# Patient Record
Sex: FEMALE | Race: WHITE | NOT HISPANIC OR LATINO
[De-identification: names, ages, dates, MRNs, and addresses within clinical notes are randomized per-mention and may not be internally consistent; named-entity substitution may affect disease eponyms.]

---

## 2018-08-30 ENCOUNTER — RESULT REVIEW (OUTPATIENT)
Age: 53
End: 2018-08-30

## 2019-11-02 ENCOUNTER — RESULT REVIEW (OUTPATIENT)
Age: 54
End: 2019-11-02

## 2022-11-02 ENCOUNTER — NON-APPOINTMENT (OUTPATIENT)
Age: 57
End: 2022-11-02

## 2025-02-04 ENCOUNTER — APPOINTMENT (OUTPATIENT)
Dept: BREAST CENTER | Facility: CLINIC | Age: 60
End: 2025-02-04
Payer: COMMERCIAL

## 2025-02-04 VITALS — WEIGHT: 155 LBS | HEIGHT: 68 IN | BODY MASS INDEX: 23.49 KG/M2

## 2025-02-04 DIAGNOSIS — N64.89 OTHER SPECIFIED DISORDERS OF BREAST: ICD-10-CM

## 2025-02-04 PROCEDURE — 99203 OFFICE O/P NEW LOW 30 MIN: CPT | Mod: 25

## 2025-02-04 PROCEDURE — 76642 ULTRASOUND BREAST LIMITED: CPT | Mod: LT

## 2025-02-21 ENCOUNTER — APPOINTMENT (OUTPATIENT)
Dept: PLASTIC SURGERY | Facility: CLINIC | Age: 60
End: 2025-02-21
Payer: COMMERCIAL

## 2025-02-21 VITALS
WEIGHT: 155 LBS | DIASTOLIC BLOOD PRESSURE: 77 MMHG | HEART RATE: 72 BPM | TEMPERATURE: 97.5 F | HEIGHT: 68 IN | SYSTOLIC BLOOD PRESSURE: 145 MMHG | OXYGEN SATURATION: 98 % | BODY MASS INDEX: 23.49 KG/M2

## 2025-02-21 DIAGNOSIS — N64.89 OTHER SPECIFIED DISORDERS OF BREAST: ICD-10-CM

## 2025-02-21 DIAGNOSIS — Z42.1 ENCOUNTER FOR BREAST RECONSTRUCTION FOLLOWING MASTECTOMY: ICD-10-CM

## 2025-02-21 DIAGNOSIS — Z85.3 PERSONAL HISTORY OF MALIGNANT NEOPLASM OF BREAST: ICD-10-CM

## 2025-02-21 PROCEDURE — 99205 OFFICE O/P NEW HI 60 MIN: CPT

## 2025-02-24 ENCOUNTER — APPOINTMENT (OUTPATIENT)
Dept: BREAST CENTER | Facility: CLINIC | Age: 60
End: 2025-02-24

## 2025-02-24 ENCOUNTER — TRANSCRIPTION ENCOUNTER (OUTPATIENT)
Age: 60
End: 2025-02-24

## 2025-02-24 VITALS
WEIGHT: 155 LBS | HEIGHT: 68 IN | TEMPERATURE: 97.8 F | HEART RATE: 74 BPM | BODY MASS INDEX: 23.49 KG/M2 | DIASTOLIC BLOOD PRESSURE: 69 MMHG | SYSTOLIC BLOOD PRESSURE: 109 MMHG | OXYGEN SATURATION: 99 %

## 2025-02-24 DIAGNOSIS — N64.89 OTHER SPECIFIED DISORDERS OF BREAST: ICD-10-CM

## 2025-02-24 DIAGNOSIS — Z85.3 PERSONAL HISTORY OF MALIGNANT NEOPLASM OF BREAST: ICD-10-CM

## 2025-02-24 PROCEDURE — 99205 OFFICE O/P NEW HI 60 MIN: CPT

## 2025-02-27 ENCOUNTER — APPOINTMENT (OUTPATIENT)
Dept: MRI IMAGING | Facility: CLINIC | Age: 60
End: 2025-02-27
Payer: COMMERCIAL

## 2025-02-27 ENCOUNTER — RESULT REVIEW (OUTPATIENT)
Age: 60
End: 2025-02-27

## 2025-02-27 PROCEDURE — 74018 RADEX ABDOMEN 1 VIEW: CPT

## 2025-02-27 PROCEDURE — 74185 MRA ABD W OR W/O CNTRST: CPT

## 2025-02-27 PROCEDURE — 72198 MR ANGIO PELVIS W/O & W/DYE: CPT

## 2025-02-27 PROCEDURE — A9585: CPT | Mod: NC,JW

## 2025-03-04 ENCOUNTER — APPOINTMENT (OUTPATIENT)
Dept: PLASTIC SURGERY | Facility: CLINIC | Age: 60
End: 2025-03-04

## 2025-03-06 ENCOUNTER — APPOINTMENT (OUTPATIENT)
Dept: MRI IMAGING | Facility: CLINIC | Age: 60
End: 2025-03-06

## 2025-03-06 ENCOUNTER — APPOINTMENT (OUTPATIENT)
Dept: RADIOLOGY | Facility: CLINIC | Age: 60
End: 2025-03-06

## 2025-03-12 ENCOUNTER — OUTPATIENT (OUTPATIENT)
Dept: OUTPATIENT SERVICES | Facility: HOSPITAL | Age: 60
LOS: 1 days | End: 2025-03-12
Payer: COMMERCIAL

## 2025-03-12 VITALS
WEIGHT: 153.88 LBS | DIASTOLIC BLOOD PRESSURE: 77 MMHG | RESPIRATION RATE: 18 BRPM | HEIGHT: 68 IN | HEART RATE: 81 BPM | SYSTOLIC BLOOD PRESSURE: 118 MMHG | TEMPERATURE: 98 F | OXYGEN SATURATION: 100 %

## 2025-03-12 DIAGNOSIS — Z90.13 ACQUIRED ABSENCE OF BILATERAL BREASTS AND NIPPLES: ICD-10-CM

## 2025-03-12 DIAGNOSIS — Z98.890 OTHER SPECIFIED POSTPROCEDURAL STATES: Chronic | ICD-10-CM

## 2025-03-12 DIAGNOSIS — Z85.3 PERSONAL HISTORY OF MALIGNANT NEOPLASM OF BREAST: ICD-10-CM

## 2025-03-12 DIAGNOSIS — Z01.818 ENCOUNTER FOR OTHER PREPROCEDURAL EXAMINATION: ICD-10-CM

## 2025-03-12 DIAGNOSIS — Z98.49 CATARACT EXTRACTION STATUS, UNSPECIFIED EYE: Chronic | ICD-10-CM

## 2025-03-12 DIAGNOSIS — N65.0 DEFORMITY OF RECONSTRUCTED BREAST: ICD-10-CM

## 2025-03-12 DIAGNOSIS — Z98.891 HISTORY OF UTERINE SCAR FROM PREVIOUS SURGERY: Chronic | ICD-10-CM

## 2025-03-12 DIAGNOSIS — N65.1 DISPROPORTION OF RECONSTRUCTED BREAST: ICD-10-CM

## 2025-03-12 LAB
ANION GAP SERPL CALC-SCNC: 6 MMOL/L — SIGNIFICANT CHANGE UP (ref 5–17)
BASOPHILS # BLD AUTO: 0.05 K/UL — SIGNIFICANT CHANGE UP (ref 0–0.2)
BASOPHILS NFR BLD AUTO: 0.9 % — SIGNIFICANT CHANGE UP (ref 0–2)
BLD GP AB SCN SERPL QL: SIGNIFICANT CHANGE UP
BUN SERPL-MCNC: 17 MG/DL — SIGNIFICANT CHANGE UP (ref 7–23)
CALCIUM SERPL-MCNC: 9.2 MG/DL — SIGNIFICANT CHANGE UP (ref 8.4–10.5)
CHLORIDE SERPL-SCNC: 105 MMOL/L — SIGNIFICANT CHANGE UP (ref 96–108)
CO2 SERPL-SCNC: 30 MMOL/L — SIGNIFICANT CHANGE UP (ref 22–31)
CREAT SERPL-MCNC: 0.71 MG/DL — SIGNIFICANT CHANGE UP (ref 0.5–1.3)
EGFR: 97 ML/MIN/1.73M2 — SIGNIFICANT CHANGE UP
EGFR: 97 ML/MIN/1.73M2 — SIGNIFICANT CHANGE UP
EOSINOPHIL # BLD AUTO: 0.18 K/UL — SIGNIFICANT CHANGE UP (ref 0–0.5)
EOSINOPHIL NFR BLD AUTO: 3.1 % — SIGNIFICANT CHANGE UP (ref 0–6)
GLUCOSE SERPL-MCNC: 83 MG/DL — SIGNIFICANT CHANGE UP (ref 70–99)
HCT VFR BLD CALC: 38.4 % — SIGNIFICANT CHANGE UP (ref 34.5–45)
HGB BLD-MCNC: 12.9 G/DL — SIGNIFICANT CHANGE UP (ref 11.5–15.5)
IMM GRANULOCYTES NFR BLD AUTO: 0.2 % — SIGNIFICANT CHANGE UP (ref 0–0.9)
LYMPHOCYTES # BLD AUTO: 1.29 K/UL — SIGNIFICANT CHANGE UP (ref 1–3.3)
LYMPHOCYTES # BLD AUTO: 21.9 % — SIGNIFICANT CHANGE UP (ref 13–44)
MCHC RBC-ENTMCNC: 30.8 PG — SIGNIFICANT CHANGE UP (ref 27–34)
MCHC RBC-ENTMCNC: 33.6 G/DL — SIGNIFICANT CHANGE UP (ref 32–36)
MCV RBC AUTO: 91.6 FL — SIGNIFICANT CHANGE UP (ref 80–100)
MONOCYTES # BLD AUTO: 0.4 K/UL — SIGNIFICANT CHANGE UP (ref 0–0.9)
MONOCYTES NFR BLD AUTO: 6.8 % — SIGNIFICANT CHANGE UP (ref 2–14)
NEUTROPHILS # BLD AUTO: 3.95 K/UL — SIGNIFICANT CHANGE UP (ref 1.8–7.4)
NEUTROPHILS NFR BLD AUTO: 67.1 % — SIGNIFICANT CHANGE UP (ref 43–77)
NRBC BLD AUTO-RTO: 0 /100 WBCS — SIGNIFICANT CHANGE UP (ref 0–0)
PLATELET # BLD AUTO: 207 K/UL — SIGNIFICANT CHANGE UP (ref 150–400)
POTASSIUM SERPL-MCNC: 4.1 MMOL/L — SIGNIFICANT CHANGE UP (ref 3.5–5.3)
POTASSIUM SERPL-SCNC: 4.1 MMOL/L — SIGNIFICANT CHANGE UP (ref 3.5–5.3)
RBC # BLD: 4.19 M/UL — SIGNIFICANT CHANGE UP (ref 3.8–5.2)
RBC # FLD: 12.7 % — SIGNIFICANT CHANGE UP (ref 10.3–14.5)
SODIUM SERPL-SCNC: 141 MMOL/L — SIGNIFICANT CHANGE UP (ref 135–145)
WBC # BLD: 5.88 K/UL — SIGNIFICANT CHANGE UP (ref 3.8–10.5)
WBC # FLD AUTO: 5.88 K/UL — SIGNIFICANT CHANGE UP (ref 3.8–10.5)

## 2025-03-12 PROCEDURE — 85025 COMPLETE CBC W/AUTO DIFF WBC: CPT

## 2025-03-12 PROCEDURE — 86850 RBC ANTIBODY SCREEN: CPT

## 2025-03-12 PROCEDURE — 36415 COLL VENOUS BLD VENIPUNCTURE: CPT

## 2025-03-12 PROCEDURE — G0463: CPT

## 2025-03-12 PROCEDURE — 80048 BASIC METABOLIC PNL TOTAL CA: CPT

## 2025-03-12 PROCEDURE — 86900 BLOOD TYPING SEROLOGIC ABO: CPT

## 2025-03-12 PROCEDURE — 86901 BLOOD TYPING SEROLOGIC RH(D): CPT

## 2025-03-12 NOTE — H&P PST ADULT - HISTORY OF PRESENT ILLNESS
Patient is a 59 year old F with PMHx of L breast CA s/p RTX presents for preoperative testing for bilateral skin sparing mastectomy with GHULAM flap reconstruction with Dr. Naz Rivera scheduled for 04/02/2025. Reports having a breast cancer scare with scheduled mammogram in February, therefore opting for upcoming surgical intervention. Denies palpable breast masses, nipple discharge, rashes, nipple inversion, breast pain or any acute symptoms at this time. Patient reports feeling well overall.

## 2025-03-12 NOTE — H&P PST ADULT - PROBLEM SELECTOR PLAN 1
Patient provided with pre-operative instructions and verbalized understanding.  Patient will be NPO on day of surgery. Patient will stop NSAIDs, aspirin, herbal supplements or vitamins 1 week prior to surgery.  Chlorhexidine wash provided with instructions, verbalized understanding.     Pending cardiac clearance as per surgeon.

## 2025-03-12 NOTE — H&P PST ADULT - PROBLEM/PLAN-1
I will give a Medrol Dosepak.  If she develops worsening shortness of breath or fever, she should go to the emergency department.   DISPLAY PLAN FREE TEXT

## 2025-03-12 NOTE — H&P PST ADULT - NSICDXPASTSURGICALHX_GEN_ALL_CORE_FT
PAST SURGICAL HISTORY:  History of cataract surgery     History of  section     S/P lumpectomy, left breast     Status post meniscectomy

## 2025-03-12 NOTE — H&P PST ADULT - NSANTHOSAYNRD_GEN_A_CORE
neck 13 inches/No. BRI screening performed.  STOP BANG Legend: 0-2 = LOW Risk; 3-4 = INTERMEDIATE Risk; 5-8 = HIGH Risk

## 2025-03-12 NOTE — H&P PST ADULT - NSICDXFAMILYHX_GEN_ALL_CORE_FT
FAMILY HISTORY:  FH: HTN (hypertension)  FHx: heart disease    Mother  Still living? Unknown  Family hx of lung cancer, Age at diagnosis: Age Unknown

## 2025-03-21 ENCOUNTER — APPOINTMENT (OUTPATIENT)
Dept: PLASTIC SURGERY | Facility: CLINIC | Age: 60
End: 2025-03-21

## 2025-03-21 VITALS
HEIGHT: 68 IN | BODY MASS INDEX: 22.73 KG/M2 | OXYGEN SATURATION: 100 % | WEIGHT: 150 LBS | DIASTOLIC BLOOD PRESSURE: 72 MMHG | TEMPERATURE: 96.5 F | SYSTOLIC BLOOD PRESSURE: 103 MMHG | HEART RATE: 80 BPM

## 2025-03-21 DIAGNOSIS — N65.0 DEFORMITY OF RECONSTRUCTED BREAST: ICD-10-CM

## 2025-03-21 DIAGNOSIS — N64.89 OTHER SPECIFIED DISORDERS OF BREAST: ICD-10-CM

## 2025-03-21 PROBLEM — C50.912 MALIGNANT NEOPLASM OF UNSPECIFIED SITE OF LEFT FEMALE BREAST: Chronic | Status: ACTIVE | Noted: 2025-03-12

## 2025-03-21 PROBLEM — Z90.13 ACQUIRED ABSENCE OF BILATERAL BREASTS AND NIPPLES: Chronic | Status: ACTIVE | Noted: 2025-03-12

## 2025-03-21 PROCEDURE — 99215 OFFICE O/P EST HI 40 MIN: CPT

## 2025-03-21 RX ORDER — IBUPROFEN 800 MG/1
800 TABLET, FILM COATED ORAL EVERY 6 HOURS
Qty: 20 | Refills: 0 | Status: ACTIVE | COMMUNITY
Start: 2025-03-21 | End: 1900-01-01

## 2025-03-21 RX ORDER — CYCLOBENZAPRINE HYDROCHLORIDE 10 MG/1
10 TABLET, FILM COATED ORAL
Qty: 15 | Refills: 0 | Status: ACTIVE | COMMUNITY
Start: 2025-03-21 | End: 1900-01-01

## 2025-03-21 RX ORDER — OXYCODONE AND ACETAMINOPHEN 5; 325 MG/1; MG/1
5-325 TABLET ORAL
Qty: 20 | Refills: 0 | Status: ACTIVE | COMMUNITY
Start: 2025-03-21 | End: 1900-01-01

## 2025-03-21 RX ORDER — SULFAMETHOXAZOLE AND TRIMETHOPRIM 800; 160 MG/1; MG/1
800-160 TABLET ORAL
Qty: 14 | Refills: 0 | Status: ACTIVE | COMMUNITY
Start: 2025-03-21 | End: 1900-01-01

## 2025-03-31 ENCOUNTER — APPOINTMENT (OUTPATIENT)
Dept: BREAST CENTER | Facility: CLINIC | Age: 60
End: 2025-03-31
Payer: COMMERCIAL

## 2025-03-31 VITALS
SYSTOLIC BLOOD PRESSURE: 109 MMHG | TEMPERATURE: 97.1 F | DIASTOLIC BLOOD PRESSURE: 74 MMHG | BODY MASS INDEX: 22.73 KG/M2 | HEIGHT: 68 IN | OXYGEN SATURATION: 96 % | HEART RATE: 76 BPM | WEIGHT: 150 LBS

## 2025-03-31 DIAGNOSIS — Z85.3 PERSONAL HISTORY OF MALIGNANT NEOPLASM OF BREAST: ICD-10-CM

## 2025-03-31 DIAGNOSIS — F41.9 ANXIETY DISORDER, UNSPECIFIED: ICD-10-CM

## 2025-03-31 DIAGNOSIS — N64.89 OTHER SPECIFIED DISORDERS OF BREAST: ICD-10-CM

## 2025-03-31 PROCEDURE — 99214 OFFICE O/P EST MOD 30 MIN: CPT

## 2025-03-31 RX ORDER — ALPRAZOLAM 0.5 MG/1
0.5 TABLET ORAL 3 TIMES DAILY
Qty: 6 | Refills: 0 | Status: ACTIVE | COMMUNITY
Start: 2025-03-31 | End: 1900-01-01

## 2025-04-01 NOTE — PATIENT PROFILE ADULT - REASON FOR REFUSAL (REFER PATIENT TO HEALTHCARE PROVIDER FOR FOLLOW-UP):
You may receive a survey in the mail regarding your office visit.  It would be greatly appreciated if you would take a few minutes to fill out the survey.  This is of great value to us.  Your response will tell us how well we met your needs.  Your honest evaluation can make us aware of our strengths as well as areas where you believe we might improve.      
never had

## 2025-04-02 ENCOUNTER — APPOINTMENT (OUTPATIENT)
Dept: PLASTIC SURGERY | Facility: HOSPITAL | Age: 60
End: 2025-04-02
Payer: COMMERCIAL

## 2025-04-02 ENCOUNTER — APPOINTMENT (OUTPATIENT)
Dept: BREAST CENTER | Facility: HOSPITAL | Age: 60
End: 2025-04-02

## 2025-04-02 ENCOUNTER — INPATIENT (INPATIENT)
Facility: HOSPITAL | Age: 60
LOS: 1 days | Discharge: HOME | DRG: 951 | End: 2025-04-04
Attending: SURGERY | Admitting: SURGERY
Payer: COMMERCIAL

## 2025-04-02 ENCOUNTER — RESULT REVIEW (OUTPATIENT)
Age: 60
End: 2025-04-02

## 2025-04-02 VITALS
OXYGEN SATURATION: 97 % | WEIGHT: 150.36 LBS | SYSTOLIC BLOOD PRESSURE: 97 MMHG | TEMPERATURE: 98 F | RESPIRATION RATE: 16 BRPM | DIASTOLIC BLOOD PRESSURE: 64 MMHG | HEART RATE: 78 BPM | HEIGHT: 68 IN

## 2025-04-02 DIAGNOSIS — Z98.890 OTHER SPECIFIED POSTPROCEDURAL STATES: Chronic | ICD-10-CM

## 2025-04-02 DIAGNOSIS — Z98.891 HISTORY OF UTERINE SCAR FROM PREVIOUS SURGERY: Chronic | ICD-10-CM

## 2025-04-02 DIAGNOSIS — Z98.49 CATARACT EXTRACTION STATUS, UNSPECIFIED EYE: Chronic | ICD-10-CM

## 2025-04-02 DIAGNOSIS — Z90.13 ACQUIRED ABSENCE OF BILATERAL BREASTS AND NIPPLES: ICD-10-CM

## 2025-04-02 LAB
BASOPHILS # BLD AUTO: 0.03 K/UL — SIGNIFICANT CHANGE UP (ref 0–0.2)
BASOPHILS NFR BLD AUTO: 0.2 % — SIGNIFICANT CHANGE UP (ref 0–2)
BLD GP AB SCN SERPL QL: SIGNIFICANT CHANGE UP
EOSINOPHIL # BLD AUTO: 0 K/UL — SIGNIFICANT CHANGE UP (ref 0–0.5)
EOSINOPHIL NFR BLD AUTO: 0 % — SIGNIFICANT CHANGE UP (ref 0–6)
HCT VFR BLD CALC: 31.6 % — LOW (ref 34.5–45)
HGB BLD-MCNC: 10.4 G/DL — LOW (ref 11.5–15.5)
IMM GRANULOCYTES NFR BLD AUTO: 0.3 % — SIGNIFICANT CHANGE UP (ref 0–0.9)
LYMPHOCYTES # BLD AUTO: 0.66 K/UL — LOW (ref 1–3.3)
LYMPHOCYTES # BLD AUTO: 4.3 % — LOW (ref 13–44)
MCHC RBC-ENTMCNC: 30.9 PG — SIGNIFICANT CHANGE UP (ref 27–34)
MCHC RBC-ENTMCNC: 32.9 G/DL — SIGNIFICANT CHANGE UP (ref 32–36)
MCV RBC AUTO: 93.8 FL — SIGNIFICANT CHANGE UP (ref 80–100)
MONOCYTES # BLD AUTO: 0.98 K/UL — HIGH (ref 0–0.9)
MONOCYTES NFR BLD AUTO: 6.3 % — SIGNIFICANT CHANGE UP (ref 2–14)
NEUTROPHILS # BLD AUTO: 13.76 K/UL — HIGH (ref 1.8–7.4)
NEUTROPHILS NFR BLD AUTO: 88.9 % — HIGH (ref 43–77)
NRBC BLD AUTO-RTO: 0 /100 WBCS — SIGNIFICANT CHANGE UP (ref 0–0)
PLATELET # BLD AUTO: 154 K/UL — SIGNIFICANT CHANGE UP (ref 150–400)
RBC # BLD: 3.37 M/UL — LOW (ref 3.8–5.2)
RBC # FLD: 12.6 % — SIGNIFICANT CHANGE UP (ref 10.3–14.5)
WBC # BLD: 15.47 K/UL — HIGH (ref 3.8–10.5)
WBC # FLD AUTO: 15.47 K/UL — HIGH (ref 3.8–10.5)

## 2025-04-02 PROCEDURE — S2068: CPT | Mod: RT

## 2025-04-02 PROCEDURE — S2068: CPT | Mod: LT

## 2025-04-02 PROCEDURE — 88305 TISSUE EXAM BY PATHOLOGIST: CPT | Mod: 26

## 2025-04-02 PROCEDURE — 88307 TISSUE EXAM BY PATHOLOGIST: CPT | Mod: 26

## 2025-04-02 PROCEDURE — 15877 SUCTION LIPECTOMY TRUNK: CPT

## 2025-04-02 PROCEDURE — 19303 MAST SIMPLE COMPLETE: CPT | Mod: 50

## 2025-04-02 DEVICE — CLIP APPLIER ETHICON LIGACLIP 9 3/8" SMALL: Type: IMPLANTABLE DEVICE | Site: BILATERAL | Status: FUNCTIONAL

## 2025-04-02 DEVICE — DOPPLER PROBE DISPOSABLE: Type: IMPLANTABLE DEVICE | Site: BILATERAL | Status: FUNCTIONAL

## 2025-04-02 DEVICE — MESH PHASIX 2.4X6.3IN: Type: IMPLANTABLE DEVICE | Site: BILATERAL | Status: FUNCTIONAL

## 2025-04-02 DEVICE — COUPLER VESSEL ANASTOMOTIC 3MM: Type: IMPLANTABLE DEVICE | Site: BILATERAL | Status: FUNCTIONAL

## 2025-04-02 DEVICE — CLIP APPLIER ETHICON LIGACLIP 11.5" MEDIUM: Type: IMPLANTABLE DEVICE | Site: BILATERAL | Status: FUNCTIONAL

## 2025-04-02 DEVICE — CARTRIDGE MICROCLIP 30: Type: IMPLANTABLE DEVICE | Site: BILATERAL | Status: FUNCTIONAL

## 2025-04-02 RX ORDER — CLINDAMYCIN PHOSPHATE 150 MG/ML
900 VIAL (ML) INJECTION EVERY 8 HOURS
Refills: 0 | Status: COMPLETED | OUTPATIENT
Start: 2025-04-02 | End: 2025-04-03

## 2025-04-02 RX ORDER — ONDANSETRON HCL/PF 4 MG/2 ML
4 VIAL (ML) INJECTION EVERY 6 HOURS
Refills: 0 | Status: DISCONTINUED | OUTPATIENT
Start: 2025-04-02 | End: 2025-04-04

## 2025-04-02 RX ORDER — SODIUM CHLORIDE 9 G/1000ML
1000 INJECTION, SOLUTION INTRAVENOUS
Refills: 0 | Status: DISCONTINUED | OUTPATIENT
Start: 2025-04-02 | End: 2025-04-02

## 2025-04-02 RX ORDER — HYDROMORPHONE/SOD CHLOR,ISO/PF 2 MG/10 ML
1 SYRINGE (ML) INJECTION ONCE
Refills: 0 | Status: DISCONTINUED | OUTPATIENT
Start: 2025-04-02 | End: 2025-04-02

## 2025-04-02 RX ORDER — HYDROMORPHONE/SOD CHLOR,ISO/PF 2 MG/10 ML
0.5 SYRINGE (ML) INJECTION
Refills: 0 | Status: DISCONTINUED | OUTPATIENT
Start: 2025-04-02 | End: 2025-04-02

## 2025-04-02 RX ORDER — ONDANSETRON HCL/PF 4 MG/2 ML
4 VIAL (ML) INJECTION ONCE
Refills: 0 | Status: DISCONTINUED | OUTPATIENT
Start: 2025-04-02 | End: 2025-04-02

## 2025-04-02 RX ORDER — ENOXAPARIN SODIUM 100 MG/ML
40 INJECTION SUBCUTANEOUS EVERY 24 HOURS
Refills: 0 | Status: DISCONTINUED | OUTPATIENT
Start: 2025-04-03 | End: 2025-04-04

## 2025-04-02 RX ORDER — SODIUM CHLORIDE 9 G/1000ML
1000 INJECTION, SOLUTION INTRAVENOUS ONCE
Refills: 0 | Status: COMPLETED | OUTPATIENT
Start: 2025-04-02 | End: 2025-04-02

## 2025-04-02 RX ORDER — ACETAMINOPHEN 500 MG/5ML
1000 LIQUID (ML) ORAL EVERY 8 HOURS
Refills: 0 | Status: COMPLETED | OUTPATIENT
Start: 2025-04-02 | End: 2025-04-03

## 2025-04-02 RX ORDER — SODIUM CHLORIDE 9 G/1000ML
1000 INJECTION, SOLUTION INTRAVENOUS
Refills: 0 | Status: DISCONTINUED | OUTPATIENT
Start: 2025-04-02 | End: 2025-04-04

## 2025-04-02 RX ORDER — HYDROMORPHONE/SOD CHLOR,ISO/PF 2 MG/10 ML
0.5 SYRINGE (ML) INJECTION EVERY 4 HOURS
Refills: 0 | Status: DISCONTINUED | OUTPATIENT
Start: 2025-04-02 | End: 2025-04-03

## 2025-04-02 RX ORDER — ALBUMIN (HUMAN) 12.5 G/50ML
100 INJECTION, SOLUTION INTRAVENOUS EVERY 4 HOURS
Refills: 0 | Status: COMPLETED | OUTPATIENT
Start: 2025-04-02 | End: 2025-04-02

## 2025-04-02 RX ORDER — ACETAMINOPHEN 500 MG/5ML
650 LIQUID (ML) ORAL EVERY 6 HOURS
Refills: 0 | Status: COMPLETED | OUTPATIENT
Start: 2025-04-02 | End: 2026-03-01

## 2025-04-02 RX ORDER — ACETAMINOPHEN 500 MG/5ML
1000 LIQUID (ML) ORAL ONCE
Refills: 0 | Status: COMPLETED | OUTPATIENT
Start: 2025-04-02 | End: 2025-04-02

## 2025-04-02 RX ORDER — SODIUM CHLORIDE 9 G/1000ML
500 INJECTION, SOLUTION INTRAVENOUS ONCE
Refills: 0 | Status: COMPLETED | OUTPATIENT
Start: 2025-04-02 | End: 2025-04-02

## 2025-04-02 RX ORDER — ALBUMIN (HUMAN) 12.5 G/50ML
100 INJECTION, SOLUTION INTRAVENOUS ONCE
Refills: 0 | Status: COMPLETED | OUTPATIENT
Start: 2025-04-02 | End: 2025-04-02

## 2025-04-02 RX ORDER — OXYCODONE HYDROCHLORIDE 30 MG/1
5 TABLET ORAL EVERY 4 HOURS
Refills: 0 | Status: DISCONTINUED | OUTPATIENT
Start: 2025-04-02 | End: 2025-04-03

## 2025-04-02 RX ORDER — KETOROLAC TROMETHAMINE 30 MG/ML
15 INJECTION, SOLUTION INTRAMUSCULAR; INTRAVENOUS EVERY 6 HOURS
Refills: 0 | Status: DISCONTINUED | OUTPATIENT
Start: 2025-04-02 | End: 2025-04-04

## 2025-04-02 RX ADMIN — Medication 100 MILLIGRAM(S): at 15:00

## 2025-04-02 RX ADMIN — Medication 0.5 MILLIGRAM(S): at 17:28

## 2025-04-02 RX ADMIN — ALBUMIN (HUMAN) 50 MILLILITER(S): 12.5 INJECTION, SOLUTION INTRAVENOUS at 22:34

## 2025-04-02 RX ADMIN — Medication 0.5 MILLIGRAM(S): at 17:32

## 2025-04-02 RX ADMIN — SODIUM CHLORIDE 50 MILLILITER(S): 9 INJECTION, SOLUTION INTRAVENOUS at 06:10

## 2025-04-02 RX ADMIN — Medication 0.5 MILLIGRAM(S): at 22:32

## 2025-04-02 RX ADMIN — SODIUM CHLORIDE 500 MILLILITER(S): 9 INJECTION, SOLUTION INTRAVENOUS at 19:56

## 2025-04-02 RX ADMIN — Medication 400 MILLIGRAM(S): at 20:22

## 2025-04-02 RX ADMIN — Medication 1000 MILLIGRAM(S): at 20:52

## 2025-04-02 RX ADMIN — SODIUM CHLORIDE 1000 MILLILITER(S): 9 INJECTION, SOLUTION INTRAVENOUS at 19:54

## 2025-04-02 RX ADMIN — KETOROLAC TROMETHAMINE 15 MILLIGRAM(S): 30 INJECTION, SOLUTION INTRAMUSCULAR; INTRAVENOUS at 16:02

## 2025-04-02 RX ADMIN — Medication 4 MILLIGRAM(S): at 20:27

## 2025-04-02 RX ADMIN — Medication 400 MILLIGRAM(S): at 15:00

## 2025-04-02 RX ADMIN — Medication 0.5 MILLIGRAM(S): at 14:17

## 2025-04-02 RX ADMIN — ALBUMIN (HUMAN) 50 MILLILITER(S): 12.5 INJECTION, SOLUTION INTRAVENOUS at 20:57

## 2025-04-02 RX ADMIN — Medication 0.5 MILLIGRAM(S): at 23:02

## 2025-04-02 RX ADMIN — KETOROLAC TROMETHAMINE 15 MILLIGRAM(S): 30 INJECTION, SOLUTION INTRAMUSCULAR; INTRAVENOUS at 16:08

## 2025-04-02 RX ADMIN — Medication 1000 MILLIGRAM(S): at 15:15

## 2025-04-02 NOTE — PRE-OP CHECKLIST - NS PREOP CHK MONITOR ANESTHESIA CONSENT
This PA request was submitted to the insurance by the Central Prior Authorization Team.  If you have any questions in regards to this request, we can be reached at [phone].     Thanks    PA Initiation    Medication: Trulicity 0.75 mg/0.5 ml soln requires a prior authorization  Insurance Company: [employer] - Phone [phone] Fax [phone]  Pharmacy Filling the Rx: [facility] - [id number] [facility] [place]  Filling Pharmacy Phone: [phone]  Filling Pharmacy Fax:    Start Date: 1/12/2019       done

## 2025-04-02 NOTE — CONSULT NOTE ADULT - ASSESSMENT
Plan:  -Serial cook dopplers   -serial flap assessment for signs of perfusion  -skin assessment for color, firmness, signs of hematoma or other changes  -abdominal incisions site  monitoring  -hourly checks on MAXIMO drain output  -pain control  -AM labs   -any and all changes or concerns regarding GHULAM procedure, flap, etc will be immediately addressed with primary surgical team

## 2025-04-02 NOTE — BRIEF OPERATIVE NOTE - OPERATION/FINDINGS
Immediate bilateral breast reconstruction with deep inferior epigastric artery  flaps following bilateral mastectomies by Dr. Rivera.

## 2025-04-02 NOTE — CONSULT NOTE ADULT - SUBJECTIVE AND OBJECTIVE BOX
Patient is a 59y old  Female who presents with a chief complaint of "I am having a ghulam" (12 Mar 2025 09:42)    BRIEF HOSPITAL COURSE:   59 year old F with PMHx of L breast CA s/p RTX presents for preoperative testing for bilateral skin sparing mastectomy with GHULAM flap reconstruction with Dr. Naz Rivera scheduled for 2025. Reports having a breast cancer scare with scheduled mammogram in February, therefore opting for upcoming surgical intervention    Events last 24 hours:  POD#0 s/p bilateral skin sparing masctetcomy, bilateral breast reconstruction with GHULAM flap.     PAST MEDICAL & SURGICAL HISTORY:  Cancer of left breast      Acquired absence of both breasts and nipples      S/P lumpectomy, left breast  2016 marker      Status post meniscectomy  left      History of  section  2      History of cataract surgery  b/l          Review of Systems:  CONSTITUTIONAL: No fever, chills, or fatigue  EYES: No eye pain, visual disturbances, or discharge  ENMT:  No difficulty hearing, tinnitus, vertigo; No sinus or throat pain  NECK: No pain or stiffness  RESPIRATORY: No cough, wheezing, chills or hemoptysis; No shortness of breath  CARDIOVASCULAR: No chest pain, palpitations, dizziness, or leg swelling  GASTROINTESTINAL: No abdominal or epigastric pain. No nausea, vomiting, or hematemesis; No diarrhea or constipation. No melena or hematochezia.  GENITOURINARY: No dysuria, frequency, hematuria, or incontinence  NEUROLOGICAL: No headaches, memory loss, loss of strength, numbness, or tremors  SKIN: No itching, burning, rashes, or lesions   MUSCULOSKELETAL: No joint pain or swelling; No muscle, back, or extremity pain  PSYCHIATRIC: No depression, anxiety, mood swings, or difficulty sleeping      Medications:  clindamycin IVPB 900 milliGRAM(s) IV Intermittent every 8 hours        acetaminophen     Tablet .. 650 milliGRAM(s) Oral every 6 hours  acetaminophen   IVPB .. 1000 milliGRAM(s) IV Intermittent every 8 hours  HYDROmorphone  Injectable 0.5 milliGRAM(s) IV Push every 10 minutes PRN  HYDROmorphone  Injectable 1 milliGRAM(s) IV Push once PRN  HYDROmorphone  Injectable 0.5 milliGRAM(s) IV Push every 4 hours PRN  ketorolac   Injectable 15 milliGRAM(s) IV Push every 6 hours  ondansetron Injectable 4 milliGRAM(s) IV Push every 6 hours PRN  ondansetron Injectable 4 milliGRAM(s) IV Push once PRN  oxyCODONE    IR 5 milliGRAM(s) Oral every 4 hours PRN              lactated ringers Bolus 500 milliLiter(s) IV Bolus once  lactated ringers. 1000 milliLiter(s) IV Continuous <Continuous>                ICU Vital Signs Last 24 Hrs  T(C): 36 (2025 14:08), Max: 36.6 (2025 05:13)  T(F): 96.8 (2025 14:08), Max: 97.9 (2025 05:13)  HR: 80 (2025 19:00) (75 - 88)  BP: 94/54 (2025 19:00) (92/53 - 101/53)  BP(mean): 60 (2025 19:00) (60 - 66)  ABP: --  ABP(mean): --  RR: 12 (2025 19:00) (12 - 16)  SpO2: 100% (2025 19:00) (97% - 100%)    O2 Parameters below as of 2025 14:08  Patient On (Oxygen Delivery Method): nasal cannula  O2 Flow (L/min): 4              I&O's Detail    2025 07:01  -  2025 19:41  --------------------------------------------------------  IN:    Lactated Ringers: 365 mL  Total IN: 365 mL    OUT:    Bulb (mL): 45 mL    Bulb (mL): 65 mL    Bulb (mL): 30 mL    Bulb (mL): 60 mL    Indwelling Catheter - Urethral (mL): 250 mL  Total OUT: 450 mL    Total NET: -85 mL            LABS:                CAPILLARY BLOOD GLUCOSE            CULTURES:      Physical Examination:    General: No acute distress.  Alert, oriented, interactive, nonfocal    HEENT: Pupils equal, reactive to light.  Symmetric.    PULM: Clear to auscultation bilaterally, no significant sputum production    CVS: Regular rate and rhythm, no murmurs, rubs, or gallops    ABD: Soft, nondistended, nontender, normoactive bowel sounds, no masses    EXT: No edema, nontender    SKIN: Warm and well perfused, no rashes noted.    NEURO: A&Ox3, strength 5/5 all extremities, cranial nerves grossly intact, no focal deficits      RADIOLOGY: ***      CRITICAL CARE TIME SPENT: ***  Evaluating/treating patient, reviewing data/labs/imaging, discussing case with multidisciplinary team, discussing plan/goals of care with patient/family. Non-inclusive of procedure time.   Patient is a 59y old  Female who presents with a chief complaint of "I am having a ghulam" (12 Mar 2025 09:42)    BRIEF HOSPITAL COURSE:   59F PMHx L breast CA s/p RTX presents for preoperative testing for bilateral skin sparing mastectomy with GHULAM flap reconstruction with Dr. Naz Rivera scheduled for 2025. Reports having a breast cancer scare with scheduled mammogram in February, therefore opting for upcoming surgical intervention    Events last 24 hours:  POD#0 s/p bilateral skin sparing mastectomy bilateral breast reconstruction with GHULAM flap.   Cook dopplers audible.     PAST MEDICAL & SURGICAL HISTORY:  Cancer of left breast      Acquired absence of both breasts and nipples      S/P lumpectomy, left breast  2016 marker      Status post meniscectomy  left      History of  section  2      History of cataract surgery  b/l      Review of Systems:  CONSTITUTIONAL: No fever, chills, or fatigue  EYES: No eye pain, visual disturbances, or discharge  ENMT:  No difficulty hearing, tinnitus, vertigo; No sinus or throat pain  NECK: No pain or stiffness  RESPIRATORY: No cough, wheezing, chills or hemoptysis; No shortness of breath  CARDIOVASCULAR: No chest pain, palpitations, dizziness, or leg swelling  GASTROINTESTINAL: No abdominal or epigastric pain. No nausea, vomiting, or hematemesis; No diarrhea or constipation. No melena or hematochezia.  GENITOURINARY: No dysuria, frequency, hematuria, or incontinence  NEUROLOGICAL: No headaches, memory loss, loss of strength, numbness, or tremors  SKIN: No itching, burning, rashes, or lesions   MUSCULOSKELETAL: No joint pain or swelling; No muscle, back, or extremity pain  PSYCHIATRIC: No depression, anxiety, mood swings, or difficulty sleeping      Medications:  clindamycin IVPB 900 milliGRAM(s) IV Intermittent every 8 hours        acetaminophen     Tablet .. 650 milliGRAM(s) Oral every 6 hours  acetaminophen   IVPB .. 1000 milliGRAM(s) IV Intermittent every 8 hours  HYDROmorphone  Injectable 0.5 milliGRAM(s) IV Push every 10 minutes PRN  HYDROmorphone  Injectable 1 milliGRAM(s) IV Push once PRN  HYDROmorphone  Injectable 0.5 milliGRAM(s) IV Push every 4 hours PRN  ketorolac   Injectable 15 milliGRAM(s) IV Push every 6 hours  ondansetron Injectable 4 milliGRAM(s) IV Push every 6 hours PRN  ondansetron Injectable 4 milliGRAM(s) IV Push once PRN  oxyCODONE    IR 5 milliGRAM(s) Oral every 4 hours PRN      lactated ringers Bolus 500 milliLiter(s) IV Bolus once  lactated ringers. 1000 milliLiter(s) IV Continuous <Continuous>    ICU Vital Signs Last 24 Hrs  T(C): 36 (2025 14:08), Max: 36.6 (2025 05:13)  T(F): 96.8 (2025 14:08), Max: 97.9 (2025 05:13)  HR: 80 (2025 19:00) (75 - 88)  BP: 94/54 (2025 19:00) (92/53 - 101/53)  BP(mean): 60 (2025 19:00) (60 - 66)  ABP: --  ABP(mean): --  RR: 12 (2025 19:00) (12 - 16)  SpO2: 100% (2025 19:00) (97% - 100%)    O2 Parameters below as of 2025 14:08  Patient On (Oxygen Delivery Method): nasal cannula  O2 Flow (L/min): 4      I&O's Detail    2025 07:01  -  2025 19:41  --------------------------------------------------------  IN:    Lactated Ringers: 365 mL  Total IN: 365 mL    OUT:    Bulb (mL): 45 mL    Bulb (mL): 65 mL    Bulb (mL): 30 mL    Bulb (mL): 60 mL    Indwelling Catheter - Urethral (mL): 250 mL  Total OUT: 450 mL    Total NET: -85 mL      LABS:        CAPILLARY BLOOD GLUCOSE      CULTURES:      Physical Examination:    General: No acute distress.  Alert, oriented, interactive, nonfocal    BREAST: breasts appears symmetrical, no signs of hematoma, soft to palpation, non tender, well perfused, turgor maintained, cap refill adequate. MAXIMO drains in place with serosanguinous drainage.     HEENT: Pupils equal, reactive to light.  Symmetric.    PULM: Clear to auscultation bilaterally, no significant sputum production    CVS: Regular rate and rhythm, no murmurs, rubs, or gallops    ABD: Soft, nondistended, nontender, normoactive bowel sounds, no masses. Flap removal site well approximated, staples in place. No active drainage, bleeding, or signs of infection.    EXT: No edema, nontender    SKIN: Warm and well perfused, no rashes noted.       Patient is a 59y old  Female who presents with a chief complaint of "I am having a ghulam" (12 Mar 2025 09:42)    BRIEF HOSPITAL COURSE:   59F PMHx L breast CA s/p RTX presents for preoperative testing for bilateral skin sparing mastectomy with GHULAM flap reconstruction with Dr. Naz Rivera scheduled for 2025. Reports having a breast cancer scare with scheduled mammogram in February, therefore opting for upcoming surgical intervention    Events last 24 hours:  POD#0 s/p bilateral skin sparing mastectomy bilateral breast reconstruction with GHULAM flap.   Cook dopplers audible.     PAST MEDICAL & SURGICAL HISTORY:  Cancer of left breast  Acquired absence of both breasts and nipples  S/P lumpectomy, left breast 2016 marker  Status post meniscectomy left  History of  section 2  History of cataract surgery b/l         FAMILY HISTORY:  FH: HTN (hypertension)  FHx: heart disease  Family hx of lung cancer (Mother)      Social History: denies smoking drinking drug use    Home Medications: none    Allergies    penicillin (Unknown)    Intolerances          Review of Systems:  CONSTITUTIONAL: No fever, chills, or fatigue  EYES: No eye pain, visual disturbances, or discharge  ENMT:  No difficulty hearing, tinnitus, vertigo; No sinus or throat pain  NECK: No pain or stiffness  RESPIRATORY: No cough, wheezing, chills or hemoptysis; No shortness of breath  CARDIOVASCULAR: No chest pain, palpitations, dizziness, or leg swelling  GASTROINTESTINAL: No abdominal or epigastric pain. No nausea, vomiting, or hematemesis; No diarrhea or constipation. No melena or hematochezia.  GENITOURINARY: No dysuria, frequency, hematuria, or incontinence  NEUROLOGICAL: No headaches, memory loss, loss of strength, numbness, or tremors  SKIN: No itching, burning, rashes, or lesions   MUSCULOSKELETAL: No joint pain or swelling; No muscle, back, or extremity pain  PSYCHIATRIC: No depression, anxiety, mood swings, or difficulty sleeping      Medications:  clindamycin IVPB 900 milliGRAM(s) IV Intermittent every 8 hours        acetaminophen     Tablet .. 650 milliGRAM(s) Oral every 6 hours  acetaminophen   IVPB .. 1000 milliGRAM(s) IV Intermittent every 8 hours  HYDROmorphone  Injectable 0.5 milliGRAM(s) IV Push every 10 minutes PRN  HYDROmorphone  Injectable 1 milliGRAM(s) IV Push once PRN  HYDROmorphone  Injectable 0.5 milliGRAM(s) IV Push every 4 hours PRN  ketorolac   Injectable 15 milliGRAM(s) IV Push every 6 hours  ondansetron Injectable 4 milliGRAM(s) IV Push every 6 hours PRN  ondansetron Injectable 4 milliGRAM(s) IV Push once PRN  oxyCODONE    IR 5 milliGRAM(s) Oral every 4 hours PRN      lactated ringers Bolus 500 milliLiter(s) IV Bolus once  lactated ringers. 1000 milliLiter(s) IV Continuous <Continuous>    ICU Vital Signs Last 24 Hrs  T(C): 36 (2025 14:08), Max: 36.6 (2025 05:13)  T(F): 96.8 (2025 14:08), Max: 97.9 (2025 05:13)  HR: 80 (2025 19:00) (75 - 88)  BP: 94/54 (2025 19:00) (92/53 - 101/53)  BP(mean): 60 (2025 19:00) (60 - 66)  ABP: --  ABP(mean): --  RR: 12 (2025 19:00) (12 - 16)  SpO2: 100% (2025 19:00) (97% - 100%)    O2 Parameters below as of 2025 14:08  Patient On (Oxygen Delivery Method): nasal cannula  O2 Flow (L/min): 4      I&O's Detail    2025 07:01  -  2025 19:41  --------------------------------------------------------  IN:    Lactated Ringers: 365 mL  Total IN: 365 mL    OUT:    Bulb (mL): 45 mL    Bulb (mL): 65 mL    Bulb (mL): 30 mL    Bulb (mL): 60 mL    Indwelling Catheter - Urethral (mL): 250 mL  Total OUT: 450 mL    Total NET: -85 mL      LABS:        CAPILLARY BLOOD GLUCOSE      CULTURES:      Physical Examination:    General: No acute distress.  Alert, oriented, interactive, nonfocal    BREAST: breasts appears symmetrical, no signs of hematoma, soft to palpation, non tender, well perfused, turgor maintained, cap refill adequate. MAXIMO drains in place with serosanguinous drainage.     HEENT: Pupils equal, reactive to light.  Symmetric.    PULM: Clear to auscultation bilaterally, no significant sputum production    CVS: Regular rate and rhythm, no murmurs, rubs, or gallops    ABD: Soft, nondistended, nontender, normoactive bowel sounds, no masses. Flap removal site well approximated, staples in place. No active drainage, bleeding, or signs of infection.    EXT: No edema, nontender    SKIN: Warm and well perfused, no rashes noted.

## 2025-04-02 NOTE — CHART NOTE - NSCHARTNOTEFT_GEN_A_CORE
SBP noted to be 80-90s (MAP 50-60s).  Patient states her BP runs low at baseline.   Dopplers intact, making urine, Asx. MAXIMO drain output minimal.   Will give LR 500cc IVF and Albumin 25% 100cc q4h x2. Check CBC, T/s.   Will continue to monitor and reassess.

## 2025-04-02 NOTE — PRE-OP CHECKLIST - BSA (M2)
Physical Therapy    Facility/Department: [facility]N  Initial Assessment    NAME: Leyda Dykes  : 1940  MRN: [id number]    Date of Service: 10/2/2021    Discharge Recommendations:  24 hour supervision or assist, Home with Home health PT   PT Equipment Recommendations  Equipment Needed:  Sheridan Fan at home)    Assessment   Body structures, Functions, Activity limitations: Decreased functional mobility ; Decreased posture;Decreased cognition;Decreased ADL status; Decreased endurance;Decreased ROM; Decreased balance;Decreased strength  Assessment: Pt presents 3 days s/p admission w/ UTI w/ hematuria, irregular heart beart, general weakness, confusion. She presents from home, where she lives w/ dtr who provides / assist/sup w/ help of [facility], in well adapted home environment, uses FWW for short distance ambulation; dtr describes details schedule of care and demonstrates competance w/ ability to provide care. Medical hx includes chronic back pain, afib, recurrent UTI, and parkinsons per EMR and dtr. Pt presents w/ the above listed deficits, however she is primarily limited by lethargy and general deconditioning at this time. She presents well below her functional baseline; normally able to complete short distance ambulation w/ FWW now dependent. She will cont to benefit from skilled acute care therapy and additional rehab services prior to DC. Dtr is preferring pt return home w/ /7 sup/assist from [facility] and [facility] PT; would require abram at home at this level to maintain pt safety given heavy assist. Recommending  PT vs SNF at this time pending further improvement in functional and available DME. Prognosis: Good  Decision Making: High Complexity  PT Education: Goals;Transfer Training;Energy Conservation;PT Role;Plan of Care;General Safety; Disease Specific Education; Functional Mobility Training;Family Education;Precautions; Adaptive Device Training;Gait Training; Injury Prevention  REQUIRES PT FOLLOW UP: Yes  Activity Tolerance  Activity Tolerance: Patient limited by cognitive status       Treatment:  Therapeutic Activity Training:   Therapeutic activity training was instructed today. Cues were given for safety, sequence, UE/LE placement, awareness, and balance. Activities performed today included bed mobility training, balance activities (~10 min total) sup-sit, sit-stand x 3, abram to recliner. Extensive conversation initiated w/ caregiver about DC planning, available DME, PLOF. Patient Diagnosis(es): The primary encounter diagnosis was Urinary tract infection without hematuria, site unspecified. Diagnoses of Generalized weakness, Subtherapeutic international normalized ratio (INR), and Atrial fibrillation with RVR ([facility] [address] were also pertinent to this visit. has a past medical history of Atrial fibrillation ([facility] [address] Back pain, Gallstone, H/O cardiovascular stress test, H/O Doppler ultrasound, H/O echocardiogram, History of echocardiogram, History of Holter monitoring, Hx of Carotid Doppler ultrasound, Leg cramping, Recurrent urinary tract infection, Spinal stenosis of lumbar region, Vitamin B12 deficiency, and Vitamin D deficiency. has no past surgical history on file. Restrictions  Restrictions/Precautions  Restrictions/Precautions: Fall Risk, General Precautions    Subjective  General  Chart Reviewed: Yes  Patient assessed for rehabilitation services?: Yes  Family / Caregiver Present: Yes (dtr)  Follows Commands: Impaired (inconsistent command follow)  Subjective  Subjective: Are you here to help me get up?   Pain Screening  Patient Currently in Pain: Denies  Vital Signs  Patient Currently in Pain: Denies       Orientation  Orientation  Overall Orientation Status:  (dtr endorses inc confusion and disorientation compared to baseline)  Social/Functional History  Social/Functional History  Lives With: Spouse  Home Layout: One level  Home Access: Ramped entrance  Bathroom Toilet: Bedside commode  Home Equipment: Hospital bed, Rolling walker, Wheelchair-manual (+ staning lift)  ADL Assistance: Needs assistance (spouse and dtr assist with all bathing , dressing, toileting ; but pt can often feed an groom self while seated)  Ambulation Assistance: Needs assistance  Transfer Assistance: Needs assistance (spouse and dtr assist p with all transfers , later in the day often has to use sit-latanya lit)  Additional Comments: dtr is pimary caregiver and can be available up to 24/7  Cognition   Cognition  Overall Cognitive Status: Exceptions  Arousal/Alertness: Inconsistent responses to stimuli  Following Commands: Inconsistently follows commands  Attention Span: Difficulty attending to directions  Memory: Decreased recall of recent events  Safety Judgement: Good awareness of safety precautions  Problem Solving: Assistance required to implement solutions;Assistance required to identify errors made  Insights: Fully aware of deficits  Initiation: Requires cues for some  Sequencing: Requires cues for some    Objective     Observation/Palpation  Posture: Fair (dec ability to participate in balance activities)  Observation: Co-eval w/ OT Marlyn Whitman. Pt presents supine, awake in bed on arrival. She is lethargic, confused, agreeable to therapy; dtr providing additional support w/ cueing and command following.     PROM RLE (degrees)  RLE PROM: WFL  AROM RLE (degrees)  RLE AROM: WFL  PROM LLE (degrees)  LLE PROM: WFL  AROM LLE (degrees)  LLE General AROM: limited by poor command follow  Strength RLE  Comment: grossly >3/5 as observed by ROM screen  Strength LLE  Comment: grossly >3/5 as observed by functional mobility; unable to formally assess strength as pt is limited by poor command follow     Sensation  Overall Sensation Status: WFL  Bed mobility  Supine to Sit: Maximum assistance;2 Person assistance  Scooting: Maximal assistance;2 Person assistance  Transfers  Sit to Stand: Maximum Assistance;2 Person Assistance (from EOB x 3)  Stand to sit: 1.81

## 2025-04-02 NOTE — PROGRESS NOTE ADULT - SUBJECTIVE AND OBJECTIVE BOX
SURGERY POST OP CHECK:  Pt. seen and examined at bedside resting comfortably. Pt c/o post-operative pain, currently receiving Toradol Pt denies nausea/vomiting. On clears tonight.   Denies fever/chills, chest pain, dyspnea, cough, dizziness.     Vital Signs Last 24 Hrs  T(C): 36 (02 Apr 2025 14:08), Max: 36.6 (02 Apr 2025 05:13)  T(F): 96.8 (02 Apr 2025 14:08), Max: 97.9 (02 Apr 2025 05:13)  HR: 85 (02 Apr 2025 17:02) (75 - 88)  BP: 92/53 (02 Apr 2025 17:02) (92/53 - 101/53)  BP(mean): 64 (02 Apr 2025 17:02) (64 - 66)  RR: 13 (02 Apr 2025 16:32) (12 - 16)  SpO2: 100% (02 Apr 2025 16:32) (97% - 100%)    Parameters below as of 02 Apr 2025 14:08  Patient On (Oxygen Delivery Method): nasal cannula  O2 Flow (L/min): 4      PHYSICAL EXAM:    GENERAL: NAD  HEAD:  Atraumatic, Normocephalic  EYES: EOMI, PERRLA, conjunctiva and sclera clear  Breast: b/l breast warm, soft, no collections noted, minimally tender to palpation, flaps viable, cook soppler signals strong b/l, incisions C/D/I, drains ss   ABDOMEN: soft, ND, appropriately tender to palpation, umbilicus viable, incisions C/D/I, drains serosang   EXTREMITIES:  calf soft, non tender b/l.     I&O's Detail    02 Apr 2025 07:01  -  02 Apr 2025 17:36  --------------------------------------------------------  IN:    Lactated Ringers: 215 mL  Total IN: 215 mL    OUT:    Bulb (mL): 30 mL    Bulb (mL): 20 mL    Bulb (mL): 35 mL    Bulb (mL): 25 mL  Total OUT: 110 mL    Total NET: 105 mL        Impression: 59y Female POD0 s/p bilateral mastectomy and b/l GHULAM recon. Pt recovering well, still in PACU. HD stable. Exam stable.     Plan:  - CLD ovn, will adv in AM  - Diaz ovn  - Luis monitors, flap checks  - C/w post-op abx  - LVX in AM  - Bedrest overnight, OOB tomorrow  - Pain control, regimen ordered  - Empty/record drain outputs    D/w Dr. Luna  Surgery

## 2025-04-02 NOTE — PRE-OP CHECKLIST - HAND OFF

## 2025-04-02 NOTE — BRIEF OPERATIVE NOTE - NSICDXBRIEFPOSTOP_GEN_ALL_CORE_FT
POST-OP DIAGNOSIS:  History of left breast cancer 02-Apr-2025 07:21:44  Iza Ivan  Acquired absence of both breasts 02-Apr-2025 07:21:52  Iza Ivan

## 2025-04-02 NOTE — BRIEF OPERATIVE NOTE - NSICDXBRIEFPREOP_GEN_ALL_CORE_FT
PRE-OP DIAGNOSIS:  History of left breast cancer 02-Apr-2025 07:21:22  Iza Ivan  Acquired absence of both breasts 02-Apr-2025 07:21:31  Iza Ivan

## 2025-04-03 LAB
ANION GAP SERPL CALC-SCNC: 7 MMOL/L — SIGNIFICANT CHANGE UP (ref 5–17)
BUN SERPL-MCNC: 11 MG/DL — SIGNIFICANT CHANGE UP (ref 7–23)
CALCIUM SERPL-MCNC: 8.2 MG/DL — LOW (ref 8.4–10.5)
CHLORIDE SERPL-SCNC: 107 MMOL/L — SIGNIFICANT CHANGE UP (ref 96–108)
CO2 SERPL-SCNC: 27 MMOL/L — SIGNIFICANT CHANGE UP (ref 22–31)
CREAT SERPL-MCNC: 0.73 MG/DL — SIGNIFICANT CHANGE UP (ref 0.5–1.3)
EGFR: 93 ML/MIN/1.73M2 — SIGNIFICANT CHANGE UP
EGFR: 93 ML/MIN/1.73M2 — SIGNIFICANT CHANGE UP
GLUCOSE SERPL-MCNC: 105 MG/DL — HIGH (ref 70–99)
HCT VFR BLD CALC: 25.9 % — LOW (ref 34.5–45)
HGB BLD-MCNC: 8.5 G/DL — LOW (ref 11.5–15.5)
MCHC RBC-ENTMCNC: 31.1 PG — SIGNIFICANT CHANGE UP (ref 27–34)
MCHC RBC-ENTMCNC: 32.8 G/DL — SIGNIFICANT CHANGE UP (ref 32–36)
MCV RBC AUTO: 94.9 FL — SIGNIFICANT CHANGE UP (ref 80–100)
NRBC BLD AUTO-RTO: 0 /100 WBCS — SIGNIFICANT CHANGE UP (ref 0–0)
PLATELET # BLD AUTO: 137 K/UL — LOW (ref 150–400)
POTASSIUM SERPL-MCNC: 4.6 MMOL/L — SIGNIFICANT CHANGE UP (ref 3.5–5.3)
POTASSIUM SERPL-SCNC: 4.6 MMOL/L — SIGNIFICANT CHANGE UP (ref 3.5–5.3)
RBC # BLD: 2.73 M/UL — LOW (ref 3.8–5.2)
RBC # FLD: 12.9 % — SIGNIFICANT CHANGE UP (ref 10.3–14.5)
SODIUM SERPL-SCNC: 141 MMOL/L — SIGNIFICANT CHANGE UP (ref 135–145)
WBC # BLD: 9.49 K/UL — SIGNIFICANT CHANGE UP (ref 3.8–10.5)
WBC # FLD AUTO: 9.49 K/UL — SIGNIFICANT CHANGE UP (ref 3.8–10.5)

## 2025-04-03 RX ORDER — SCOPOLAMINE 1 MG/3D
1 PATCH, EXTENDED RELEASE TRANSDERMAL ONCE
Refills: 0 | Status: COMPLETED | OUTPATIENT
Start: 2025-04-03 | End: 2025-04-03

## 2025-04-03 RX ORDER — METOCLOPRAMIDE HCL 10 MG
10 TABLET ORAL ONCE
Refills: 0 | Status: COMPLETED | OUTPATIENT
Start: 2025-04-03 | End: 2025-04-03

## 2025-04-03 RX ORDER — HYDROMORPHONE/SOD CHLOR,ISO/PF 2 MG/10 ML
0.5 SYRINGE (ML) INJECTION EVERY 4 HOURS
Refills: 0 | Status: DISCONTINUED | OUTPATIENT
Start: 2025-04-03 | End: 2025-04-04

## 2025-04-03 RX ORDER — TRAMADOL HYDROCHLORIDE 50 MG/1
50 TABLET, FILM COATED ORAL EVERY 4 HOURS
Refills: 0 | Status: DISCONTINUED | OUTPATIENT
Start: 2025-04-03 | End: 2025-04-04

## 2025-04-03 RX ORDER — SODIUM CHLORIDE 9 G/1000ML
500 INJECTION, SOLUTION INTRAVENOUS ONCE
Refills: 0 | Status: COMPLETED | OUTPATIENT
Start: 2025-04-03 | End: 2025-04-03

## 2025-04-03 RX ADMIN — Medication 0.5 MILLIGRAM(S): at 04:06

## 2025-04-03 RX ADMIN — KETOROLAC TROMETHAMINE 15 MILLIGRAM(S): 30 INJECTION, SOLUTION INTRAMUSCULAR; INTRAVENOUS at 11:15

## 2025-04-03 RX ADMIN — Medication 0.5 MILLIGRAM(S): at 07:25

## 2025-04-03 RX ADMIN — TRAMADOL HYDROCHLORIDE 50 MILLIGRAM(S): 50 TABLET, FILM COATED ORAL at 19:20

## 2025-04-03 RX ADMIN — Medication 0.5 MILLIGRAM(S): at 11:15

## 2025-04-03 RX ADMIN — ENOXAPARIN SODIUM 40 MILLIGRAM(S): 100 INJECTION SUBCUTANEOUS at 06:19

## 2025-04-03 RX ADMIN — TRAMADOL HYDROCHLORIDE 50 MILLIGRAM(S): 50 TABLET, FILM COATED ORAL at 15:50

## 2025-04-03 RX ADMIN — KETOROLAC TROMETHAMINE 15 MILLIGRAM(S): 30 INJECTION, SOLUTION INTRAMUSCULAR; INTRAVENOUS at 17:23

## 2025-04-03 RX ADMIN — Medication 4 MILLIGRAM(S): at 07:26

## 2025-04-03 RX ADMIN — Medication 0.5 MILLIGRAM(S): at 07:40

## 2025-04-03 RX ADMIN — Medication 100 MILLIGRAM(S): at 00:22

## 2025-04-03 RX ADMIN — SCOPOLAMINE 1 PATCH: 1 PATCH, EXTENDED RELEASE TRANSDERMAL at 14:56

## 2025-04-03 RX ADMIN — Medication 1000 MILLIGRAM(S): at 06:22

## 2025-04-03 RX ADMIN — SODIUM CHLORIDE 1000 MILLILITER(S): 9 INJECTION, SOLUTION INTRAVENOUS at 00:42

## 2025-04-03 RX ADMIN — TRAMADOL HYDROCHLORIDE 50 MILLIGRAM(S): 50 TABLET, FILM COATED ORAL at 20:15

## 2025-04-03 RX ADMIN — KETOROLAC TROMETHAMINE 15 MILLIGRAM(S): 30 INJECTION, SOLUTION INTRAMUSCULAR; INTRAVENOUS at 00:22

## 2025-04-03 RX ADMIN — KETOROLAC TROMETHAMINE 15 MILLIGRAM(S): 30 INJECTION, SOLUTION INTRAMUSCULAR; INTRAVENOUS at 00:52

## 2025-04-03 RX ADMIN — KETOROLAC TROMETHAMINE 15 MILLIGRAM(S): 30 INJECTION, SOLUTION INTRAMUSCULAR; INTRAVENOUS at 17:33

## 2025-04-03 RX ADMIN — Medication 1000 MILLIGRAM(S): at 14:53

## 2025-04-03 RX ADMIN — Medication 0.5 MILLIGRAM(S): at 11:07

## 2025-04-03 RX ADMIN — KETOROLAC TROMETHAMINE 15 MILLIGRAM(S): 30 INJECTION, SOLUTION INTRAMUSCULAR; INTRAVENOUS at 06:19

## 2025-04-03 RX ADMIN — Medication 1000 MILLIGRAM(S): at 22:00

## 2025-04-03 RX ADMIN — Medication 400 MILLIGRAM(S): at 21:12

## 2025-04-03 RX ADMIN — TRAMADOL HYDROCHLORIDE 50 MILLIGRAM(S): 50 TABLET, FILM COATED ORAL at 14:57

## 2025-04-03 RX ADMIN — ALBUMIN (HUMAN) 50 MILLILITER(S): 12.5 INJECTION, SOLUTION INTRAVENOUS at 00:26

## 2025-04-03 RX ADMIN — SODIUM CHLORIDE 150 MILLILITER(S): 9 INJECTION, SOLUTION INTRAVENOUS at 11:50

## 2025-04-03 RX ADMIN — Medication 400 MILLIGRAM(S): at 14:18

## 2025-04-03 RX ADMIN — SCOPOLAMINE 1 PATCH: 1 PATCH, EXTENDED RELEASE TRANSDERMAL at 19:00

## 2025-04-03 RX ADMIN — Medication 400 MILLIGRAM(S): at 06:22

## 2025-04-03 RX ADMIN — Medication 0.5 MILLIGRAM(S): at 03:36

## 2025-04-03 RX ADMIN — Medication 10 MILLIGRAM(S): at 11:15

## 2025-04-03 RX ADMIN — KETOROLAC TROMETHAMINE 15 MILLIGRAM(S): 30 INJECTION, SOLUTION INTRAMUSCULAR; INTRAVENOUS at 23:34

## 2025-04-03 RX ADMIN — KETOROLAC TROMETHAMINE 15 MILLIGRAM(S): 30 INJECTION, SOLUTION INTRAMUSCULAR; INTRAVENOUS at 11:06

## 2025-04-03 RX ADMIN — SODIUM CHLORIDE 1000 MILLILITER(S): 9 INJECTION, SOLUTION INTRAVENOUS at 15:00

## 2025-04-03 NOTE — PROGRESS NOTE ADULT - SUBJECTIVE AND OBJECTIVE BOX
Resting comfortably in chair, states that surgical pain is well controlled.  Tolerating clears, Diaz out and due to void.  No chest pain, nausea, vomiting, lightheadedness or dizziness.    Vital Signs Last 24 Hrs  T(C): 36.5 (03 Apr 2025 10:00), Max: 36.7 (03 Apr 2025 06:00)  T(F): 97.7 (03 Apr 2025 10:00), Max: 98 (03 Apr 2025 06:00)  HR: 77 (03 Apr 2025 10:00) (72 - 88)  BP: 95/61 (03 Apr 2025 10:00) (85/44 - 102/57)  BP(mean): 62 (03 Apr 2025 06:00) (53 - 70)  RR: 16 (03 Apr 2025 10:00) (12 - 16)  SpO2: 98% RA (03 Apr 2025 10:00) (98% - 100%)    I&O's Detail    02 Apr 2025 07:01  -  03 Apr 2025 07:00  --------------------------------------------------------  IN:    Lactated Ringers: 365 mL    Lactated Ringers: 1800 mL    Lactated Ringers Bolus: 1000 mL  Total IN: 3165 mL    OUT:    Bulb (mL): 120 mL    Bulb (mL): 75 mL    Bulb (mL): 90 mL    Bulb (mL): 55 mL    Indwelling Catheter - Urethral (mL): 250 mL    Voided (mL): 800 mL  Total OUT: 1390 mL    Total NET: 1775 mL      03 Apr 2025 07:01  -  03 Apr 2025 11:06  --------------------------------------------------------  IN:    Lactated Ringers: 450 mL    Oral Fluid: 150 mL  Total IN: 600 mL    OUT:    Bulb (mL): 16 mL    Bulb (mL): 18 mL    Bulb (mL): 12 mL    Bulb (mL): 19 mL  Total OUT: 65 mL    Total NET: 535 mL    Labs                        10.4   15.47 )-----------( 154      ( 02 Apr 2025 22:07 )             31.6     141  |  107  |  11  ----------------------------<  105[H]  4.6   |  27  |  0.73  Ca    8.2[L]      03 Apr 2025 07:50    Current Medications  acetaminophen     Tablet .. 650 milliGRAM(s) Oral every 6 hours  acetaminophen   IVPB .. 1000 milliGRAM(s) IV Intermittent every 8 hours  enoxaparin Injectable 40 milliGRAM(s) SubCutaneous every 24 hours  ketorolac   Injectable 15 milliGRAM(s) IV Push every 6 hours  lactated ringers. 1000 milliLiter(s) (150 mL/Hr) IV Continuous <Continuous>  metoclopramide Injectable 10 milliGRAM(s) IV Push once  HYDROmorphone  Injectable 0.5 milliGRAM(s) IV Push every 4 hours PRN Severe Pain (7 - 10)  ondansetron Injectable 4 milliGRAM(s) IV Push every 6 hours PRN Nausea and/or Vomiting  oxyCODONE    IR 5 milliGRAM(s) Oral every 4 hours PRN Moderate Pain (4 - 6)    Physical Exam  Gen:  WN/WD female resting in bed, NAD  ENT:  NC/AT, no JVD noted  Thorax: Symmetric with no retractions  Right breast: Mastectomy skin flap ecchymosis, soft. no areas of induration. Flap soft and pink with 2-3 second capillary refill. Cook with audible arterial signal. Drain(s) serosanguinous.  Left breast: Mastectomy skin flap ecchymosis, soft. no areas of induration. Flap soft and pink with 2-3 second capillary refill,  Cook with audible arterial signal.  Drain(s) serosanguinous.  Lung:  Good air entry, CTA b/l  CV:  S1, S2 distinct, RRR  Abd:  Soft, NT/ND.  BS present, surgical incisions C/D/I  Extrem:  No C/C/E, DP/radial pulses +2  Neuro:  No gross motor/sensory deficits  Psych:  Awake, alert and calm Critical Care follow up note    Resting comfortably in chair, states that surgical pain is well controlled.  Tolerating clears, Diaz out and due to void.  No chest pain, nausea, vomiting, lightheadedness or dizziness.    Vital Signs Last 24 Hrs  T(C): 36.5 (03 Apr 2025 10:00), Max: 36.7 (03 Apr 2025 06:00)  T(F): 97.7 (03 Apr 2025 10:00), Max: 98 (03 Apr 2025 06:00)  HR: 77 (03 Apr 2025 10:00) (72 - 88)  BP: 95/61 (03 Apr 2025 10:00) (85/44 - 102/57)  BP(mean): 62 (03 Apr 2025 06:00) (53 - 70)  RR: 16 (03 Apr 2025 10:00) (12 - 16)  SpO2: 98% RA (03 Apr 2025 10:00) (98% - 100%)    I&O's Detail    02 Apr 2025 07:01  -  03 Apr 2025 07:00  --------------------------------------------------------  IN:    Lactated Ringers: 365 mL    Lactated Ringers: 1800 mL    Lactated Ringers Bolus: 1000 mL  Total IN: 3165 mL    OUT:    Bulb (mL): 120 mL    Bulb (mL): 75 mL    Bulb (mL): 90 mL    Bulb (mL): 55 mL    Indwelling Catheter - Urethral (mL): 250 mL    Voided (mL): 800 mL  Total OUT: 1390 mL    Total NET: 1775 mL      03 Apr 2025 07:01  -  03 Apr 2025 11:06  --------------------------------------------------------  IN:    Lactated Ringers: 450 mL    Oral Fluid: 150 mL  Total IN: 600 mL    OUT:    Bulb (mL): 16 mL    Bulb (mL): 18 mL    Bulb (mL): 12 mL    Bulb (mL): 19 mL  Total OUT: 65 mL    Total NET: 535 mL    Labs                        10.4   15.47 )-----------( 154      ( 02 Apr 2025 22:07 )             31.6     141  |  107  |  11  ----------------------------<  105[H]  4.6   |  27  |  0.73  Ca    8.2[L]      03 Apr 2025 07:50    Current Medications  acetaminophen     Tablet .. 650 milliGRAM(s) Oral every 6 hours  acetaminophen   IVPB .. 1000 milliGRAM(s) IV Intermittent every 8 hours  enoxaparin Injectable 40 milliGRAM(s) SubCutaneous every 24 hours  ketorolac   Injectable 15 milliGRAM(s) IV Push every 6 hours  lactated ringers. 1000 milliLiter(s) (150 mL/Hr) IV Continuous <Continuous>  metoclopramide Injectable 10 milliGRAM(s) IV Push once  HYDROmorphone  Injectable 0.5 milliGRAM(s) IV Push every 4 hours PRN Severe Pain (7 - 10)  ondansetron Injectable 4 milliGRAM(s) IV Push every 6 hours PRN Nausea and/or Vomiting  oxyCODONE    IR 5 milliGRAM(s) Oral every 4 hours PRN Moderate Pain (4 - 6)    Physical Exam  Gen:  WN/WD female resting in bed, NAD  ENT:  NC/AT, no JVD noted  Thorax: Symmetric with no retractions  Right breast: Mastectomy skin flap ecchymosis, soft. no areas of induration. Flap soft and pink with 2-3 second capillary refill. Cook with audible arterial signal. Drain(s) serosanguinous.  Left breast: Mastectomy skin flap ecchymosis, soft. no areas of induration. Flap soft and pink with 2-3 second capillary refill,  Cook with audible arterial signal.  Drain(s) serosanguinous.  Lung:  Good air entry, CTA b/l  CV:  S1, S2 distinct, RRR  Abd:  Soft, NT/ND.  BS present, surgical incisions C/D/I  Extrem:  No C/C/E, DP/radial pulses +2  Neuro:  No gross motor/sensory deficits  Psych:  Awake, alert and calm

## 2025-04-03 NOTE — PROGRESS NOTE ADULT - SUBJECTIVE AND OBJECTIVE BOX
patient is doing well  no cp, no sob, no fevers, no calf pain  on exam, both breasts soft. no mastectomy skin flap ischemia.  GHULAM flap viable with good color and capillary refill. audible doppler signal.  abdomen soft. incisions intact. umbilicus viable.  all surgical sites soft without evidence of hematoma.

## 2025-04-03 NOTE — PROGRESS NOTE ADULT - NS ATTEND AMEND GEN_ALL_CORE FT
patient seen and examined   Comfortable  pt oob to chair  wounds evaluated today  intact, islands with good color, both breast soft, abd incision intact  Patient comfortable.   Can transition care from ICU to surgical team

## 2025-04-03 NOTE — PROGRESS NOTE ADULT - SUBJECTIVE AND OBJECTIVE BOX
INTERVAL HPI/OVERNIGHT EVENTS:  Pt. seen and examined at bedside resting comfortably. NAEO. Pt c/o persistent pain and nausea. Reports oxycodone makes her nauseous. Tolerated clears, now tolerating regular w/o vomiting.   Denies fever/chills, chest pain, dyspnea, cough, dizziness.     Vital Signs Last 24 Hrs  T(C): 36.7 (03 Apr 2025 14:00), Max: 36.7 (03 Apr 2025 06:00)  T(F): 98 (03 Apr 2025 14:00), Max: 98 (03 Apr 2025 06:00)  HR: 79 (03 Apr 2025 14:00) (72 - 85)  BP: 94/58 (03 Apr 2025 14:00) (85/44 - 102/57)  BP(mean): 62 (03 Apr 2025 06:00) (53 - 70)  RR: 16 (03 Apr 2025 14:00) (12 - 16)  SpO2: 99% (03 Apr 2025 14:00) (98% - 100%)    Parameters below as of 03 Apr 2025 14:00  Patient On (Oxygen Delivery Method): room air      PHYSICAL EXAM:    GENERAL: NAD  HEAD:  Atraumatic, Normocephalic  EYES: EOMI, PERRLA, conjunctiva and sclera clear  Breast: b/l breast warm, soft, no collections noted, minimally tender to palpation, flaps viable, cook soppler signals strong b/l, incisions C/D/I, drains ss   ABDOMEN: soft, ND, appropriately tender to palpation, umbilicus viable, incisions C/D/I, drains serosang   EXTREMITIES:  calf soft, non tender b/l.     I&O's Detail    02 Apr 2025 07:01  -  03 Apr 2025 07:00  --------------------------------------------------------  IN:    Lactated Ringers: 365 mL    Lactated Ringers: 1800 mL    Lactated Ringers Bolus: 1000 mL  Total IN: 3165 mL    OUT:    Bulb (mL): 120 mL    Bulb (mL): 75 mL    Bulb (mL): 90 mL    Bulb (mL): 55 mL    Indwelling Catheter - Urethral (mL): 250 mL    Voided (mL): 800 mL  Total OUT: 1390 mL    Total NET: 1775 mL      03 Apr 2025 07:01  -  03 Apr 2025 16:13  --------------------------------------------------------  IN:    IV PiggyBack: 100 mL    IV PiggyBack: 500 mL    Lactated Ringers: 1200 mL    Oral Fluid: 350 mL  Total IN: 2150 mL    OUT:    Bulb (mL): 16 mL    Bulb (mL): 18 mL    Bulb (mL): 12 mL    Bulb (mL): 19 mL  Total OUT: 65 mL    Total NET: 2085 mL          LABS:                        8.5    9.49  )-----------( 137      ( 03 Apr 2025 07:50 )             25.9     04-03    141  |  107  |  11  ----------------------------<  105[H]  4.6   |  27  |  0.73    Ca    8.2[L]      03 Apr 2025 07:50        Urinalysis Basic - ( 03 Apr 2025 07:50 )    Color: x / Appearance: x / SG: x / pH: x  Gluc: 105 mg/dL / Ketone: x  / Bili: x / Urobili: x   Blood: x / Protein: x / Nitrite: x   Leuk Esterase: x / RBC: x / WBC x   Sq Epi: x / Non Sq Epi: x / Bacteria: x        Impression: 59y Female POD1 s/p bilateral mastectomy, b/l GHULAM reconstruction. Pt c/o nausea and pain. Altered pain regimen, trialing tramadol. For nausea, zofran/reglan not working. Trialing scop patch.   Exam stable, HD stable, hgb stable. Pending TOV, BS showed 237mL. Gave 500cc bolus, encouraging PO intake.    Plan:  - C/w reg diet  - Added scop patch w/ Zofran  - Switched oxycodone for tramadol. Reordered dilaudid  - Diaz removed this AM, pending TOV. BS 237cc, will repeat at 1730  - 500cc LR bolus ordered  - OOB, ambulation  - Pain control  - Drain care/education    D/w Dr. Luna & surgical team  Surgery

## 2025-04-04 ENCOUNTER — TRANSCRIPTION ENCOUNTER (OUTPATIENT)
Age: 60
End: 2025-04-04

## 2025-04-04 VITALS
DIASTOLIC BLOOD PRESSURE: 67 MMHG | HEART RATE: 72 BPM | RESPIRATION RATE: 16 BRPM | OXYGEN SATURATION: 98 % | TEMPERATURE: 98 F | SYSTOLIC BLOOD PRESSURE: 112 MMHG

## 2025-04-04 PROCEDURE — 85027 COMPLETE CBC AUTOMATED: CPT

## 2025-04-04 PROCEDURE — 36415 COLL VENOUS BLD VENIPUNCTURE: CPT

## 2025-04-04 PROCEDURE — 86901 BLOOD TYPING SEROLOGIC RH(D): CPT

## 2025-04-04 PROCEDURE — C9399: CPT

## 2025-04-04 PROCEDURE — 85025 COMPLETE CBC W/AUTO DIFF WBC: CPT

## 2025-04-04 PROCEDURE — P9047: CPT

## 2025-04-04 PROCEDURE — 86900 BLOOD TYPING SEROLOGIC ABO: CPT

## 2025-04-04 PROCEDURE — 80048 BASIC METABOLIC PNL TOTAL CA: CPT

## 2025-04-04 PROCEDURE — 88305 TISSUE EXAM BY PATHOLOGIST: CPT

## 2025-04-04 PROCEDURE — C1889: CPT

## 2025-04-04 PROCEDURE — 86850 RBC ANTIBODY SCREEN: CPT

## 2025-04-04 PROCEDURE — 88307 TISSUE EXAM BY PATHOLOGIST: CPT

## 2025-04-04 PROCEDURE — C1781: CPT

## 2025-04-04 RX ORDER — TRAMADOL HYDROCHLORIDE 50 MG/1
1 TABLET, FILM COATED ORAL
Qty: 20 | Refills: 0
Start: 2025-04-04 | End: 2025-04-08

## 2025-04-04 RX ORDER — HYDROMORPHONE/SOD CHLOR,ISO/PF 2 MG/10 ML
2 SYRINGE (ML) INJECTION ONCE
Refills: 0 | Status: DISCONTINUED | OUTPATIENT
Start: 2025-04-04 | End: 2025-04-04

## 2025-04-04 RX ORDER — ACETAMINOPHEN 500 MG/5ML
650 LIQUID (ML) ORAL EVERY 6 HOURS
Refills: 0 | Status: DISCONTINUED | OUTPATIENT
Start: 2025-04-04 | End: 2025-04-04

## 2025-04-04 RX ADMIN — Medication 650 MILLIGRAM(S): at 05:06

## 2025-04-04 RX ADMIN — SCOPOLAMINE 1 PATCH: 1 PATCH, EXTENDED RELEASE TRANSDERMAL at 07:08

## 2025-04-04 RX ADMIN — KETOROLAC TROMETHAMINE 15 MILLIGRAM(S): 30 INJECTION, SOLUTION INTRAMUSCULAR; INTRAVENOUS at 00:21

## 2025-04-04 RX ADMIN — Medication 650 MILLIGRAM(S): at 11:07

## 2025-04-04 RX ADMIN — ENOXAPARIN SODIUM 40 MILLIGRAM(S): 100 INJECTION SUBCUTANEOUS at 05:09

## 2025-04-04 RX ADMIN — KETOROLAC TROMETHAMINE 15 MILLIGRAM(S): 30 INJECTION, SOLUTION INTRAMUSCULAR; INTRAVENOUS at 05:52

## 2025-04-04 RX ADMIN — KETOROLAC TROMETHAMINE 15 MILLIGRAM(S): 30 INJECTION, SOLUTION INTRAMUSCULAR; INTRAVENOUS at 05:06

## 2025-04-04 RX ADMIN — Medication 650 MILLIGRAM(S): at 05:52

## 2025-04-04 RX ADMIN — KETOROLAC TROMETHAMINE 15 MILLIGRAM(S): 30 INJECTION, SOLUTION INTRAMUSCULAR; INTRAVENOUS at 11:07

## 2025-04-04 RX ADMIN — Medication 2 MILLIGRAM(S): at 09:09

## 2025-04-04 RX ADMIN — Medication 4 MILLIGRAM(S): at 08:27

## 2025-04-04 NOTE — DISCHARGE NOTE PROVIDER - NSDCFUADDINST_GEN_ALL_CORE_FT
Follow instructions provided by Dr. Luna's office  All prescriptions have been sent to pharmacy by office, take as prescribed  Wear surgical bra day and night until instructed otherwise by surgeon  Empty and record drain outputs twice per day. Do not allow drains to hang freely while in shower, keep elevated  May shower starting tomorrow, let water run over and pat dry. Do not scrub incisions. Keep drains elevated while showering  No heavy lifting until cleared by surgeon, light activity is allowed  Sleep on back, head elevated with wedge    Monitor your breasts and abdomen while at home, if you notice any changes to flap tissue coloring, change in color/output amount in drains, severe pain not controlled with medication please call office urgently or return to hospital   Follow instructions provided by Dr. Luna's office  All prescriptions have been sent to pharmacy by office, take as prescribed.  A new pain medication was sent for you, take instead of narcotic (oxycodone) sent by Dr. Luna. Take Tramadol 50mg every 6 hours as needed for severe pain. Follow other pain regimen recommendations given by Dr. Luna's office. Do not take both oxycodone and tramadol together.   Wear surgical bra day and night until instructed otherwise by surgeon  Empty and record drain outputs twice per day. Do not allow drains to hang freely while in shower, keep elevated  May shower starting tomorrow, let water run over and pat dry. Do not scrub incisions. Keep drains elevated while showering  No heavy lifting until cleared by surgeon, light activity is allowed  Sleep on back, head elevated with wedge    Monitor your breasts and abdomen while at home, if you notice any changes to flap tissue coloring, change in color/output amount in drains, severe pain not controlled with medication please call office urgently or return to hospital   Follow instructions provided by Dr. Luna's office  Prescriptions have been sent to pharmacy by office, take as prescribed.  A new pain medication was sent for you, take instead of narcotic (oxycodone) sent by Dr. Luna. Take Tramadol 50mg every 6 hours as needed for severe pain. Follow other pain regimen recommendations given by Dr. Luna's office. Do not take both oxycodone and tramadol together.   Wear surgical bra day and night until instructed otherwise by surgeon, utilize pads given for each breast.  Empty and record drain outputs twice per day. Do not allow drains to hang freely while in shower, keep elevated  May shower starting tomorrow, let water run over and pat dry. Do not scrub incisions. Keep drains elevated while showering.   There are thin wires on lower part of each breast, do not pull on these. Just leave in place and Dr. Luna will remove in office.   No heavy lifting until cleared by surgeon, light activity is allowed  Sleep on back, head elevated with wedge    Monitor your breasts and abdomen while at home, if you notice any changes to flap tissue coloring, change in color/output amount in drains, severe pain not controlled with medication please call office urgently or return to hospital

## 2025-04-04 NOTE — PROGRESS NOTE ADULT - SUBJECTIVE AND OBJECTIVE BOX
Patient seen and examined at bedside. She is POD#2 s/p bilateral GHULAM flap. Recovering well. Was nauseous yesterday, now improved.     Vital Signs Last 24 Hrs  T(C): 36.7 (04 Apr 2025 04:56), Max: 37.1 (03 Apr 2025 20:30)  T(F): 98 (04 Apr 2025 04:56), Max: 98.7 (03 Apr 2025 20:30)  HR: 76 (04 Apr 2025 04:56) (73 - 79)  BP: 106/66 (04 Apr 2025 04:56) (94/58 - 116/77)  BP(mean): --  RR: 16 (04 Apr 2025 04:56) (15 - 16)  SpO2: 96% (04 Apr 2025 04:56) (95% - 99%)    Parameters below as of 04 Apr 2025 04:56  Patient On (Oxygen Delivery Method): room air    I&O's Detail    03 Apr 2025 07:01  -  04 Apr 2025 07:00  --------------------------------------------------------  IN:    IV PiggyBack: 100 mL    IV PiggyBack: 500 mL    Lactated Ringers: 1350 mL    Oral Fluid: 1211 mL  Total IN: 3161 mL    OUT:    Bulb (mL): 81 mL    Bulb (mL): 60 mL    Bulb (mL): 86 mL    Bulb (mL): 63.5 mL    Voided (mL): 650 mL  Total OUT: 940.5 mL    Total NET: 2220.5 mL      Exam:  NAD, AxOx3  Bilateral breast flaps a re soft and warm, skin paddles with good color. Strong doppler signals  Abdomen is soft. Incisions C/D/I  All drains serosang. No signs of bleeding.

## 2025-04-04 NOTE — PROGRESS NOTE ADULT - ASSESSMENT
s/p bilateral skin sparing mastectomy and GHULAM flap breast reconstruction, POD 1  plan:  d/c staples  regular diet  OOB to chair  encourage water and incentive spirometer  cont DVT chemoppx  cont flap checks
POD#2 s/p bilateral mastectomy and GHULAM flap reconstruction, recovering well.     Continue flap monitoring  drain care and teaching  DVT ppx  OOB to chair and ambulate  pain control  plan for discharge to home today  flap monitoring and restrictions reviewed for discharge  follow up with Dr. Luna on Monday
[Time Spent: ___ minutes] : I have spent [unfilled] minutes of time on the encounter.
59 year old female POD #1 bilateral mastectomies with lymph node dissection, immediate GHULAM flap reonstruction    Plan  Monitor Cook signal, VS and drain outputs  Advance diet as tolerated  Encourage activity  Monitor for voiding  Incentive spirometry 10x hour  Analgesics/antiemetics standing and PRN  Wound care and labs as per surgical team  DVT ppx

## 2025-04-04 NOTE — DISCHARGE NOTE PROVIDER - NSDCFUSCHEDAPPT_GEN_ALL_CORE_FT
Stanley Luna Physician [facility]  [address]  Scheduled Appointment: 04/07/2025    Stanley Luna [facility]  [address]  Scheduled Appointment: 04/21/2025

## 2025-04-04 NOTE — DISCHARGE NOTE NURSING/CASE MANAGEMENT/SOCIAL WORK - PATIENT PORTAL LINK FT
You can access the FollowMyHealth Patient Portal offered by [facility] by registering at the following website: [email or web address]. By joining [employer]’s FollowMyHealth portal, you will also be able to view your health information using other applications (apps) compatible with our system.

## 2025-04-04 NOTE — DISCHARGE NOTE PROVIDER - HOSPITAL COURSE
Hospital Course: 60yo F with PMHx of L breast CA s/p RTX presents to [facility] on 4/2/25 for planned procedure. Pt proceeded to OR, now s/p bilateral mastectomy, b/l GHULAM recon with Shama Rivera and Cheryl on 4/2. Operative course uneventful, pt recovered in PACU and went to surgical floor for further monitoring and care. Pt has Neogrowth doppler monitors.  POD1 pt was examined, pain controlled, HD stable, staples removed and voided freely, tolerated diet, OOB to chair.  POD2 pt was seen by attending surgeon and cleared for discharge home. At time of discharge pt was voiding, pain controlled, tolerating diet, OOB and ambulating with assistance. Pt understands and agreeable to plan.

## 2025-04-04 NOTE — PROGRESS NOTE ADULT - SUBJECTIVE AND OBJECTIVE BOX
SURGERY PROGRESS NOTE  Pt. seen and examined at bedside resting comfortably. ESTER. Pt reports tramadol helped pain, but gave her a headache. Scop patch improved nausea. Tolerating reg diet, denies vomiting. Mild nausea still present. Voiding freely.   Denies fever/chills, chest pain, dyspnea, cough, dizziness.     Vital Signs Last 24 Hrs  T(C): 37.1 (04 Apr 2025 09:02), Max: 37.1 (03 Apr 2025 20:30)  T(F): 98.7 (04 Apr 2025 09:02), Max: 98.7 (03 Apr 2025 20:30)  HR: 66 (04 Apr 2025 09:02) (66 - 79)  BP: 104/52 (04 Apr 2025 09:02) (94/58 - 116/77)  BP(mean): --  RR: 16 (04 Apr 2025 09:02) (15 - 16)  SpO2: 97% (04 Apr 2025 09:02) (95% - 99%)    Parameters below as of 04 Apr 2025 09:02  Patient On (Oxygen Delivery Method): room air      PHYSICAL EXAM:    GENERAL: NAD  HEAD:  Atraumatic, Normocephalic  EYES: EOMI, PERRLA, conjunctiva and sclera clear  Breast: b/l breast warm, soft, no collections noted, minimally tender to palpation, flaps viable, cook soppler signals strong b/l, incisions C/D/I, drains ss   ABDOMEN: soft, ND, appropriately tender to palpation, umbilicus viable, incisions C/D/I, drains serosang   EXTREMITIES:  calf soft, non tender b/l.     I&O's Detail    03 Apr 2025 07:01  -  04 Apr 2025 07:00  --------------------------------------------------------  IN:    IV PiggyBack: 100 mL    IV PiggyBack: 500 mL    Lactated Ringers: 1350 mL    Oral Fluid: 1211 mL  Total IN: 3161 mL    OUT:    Bulb (mL): 81 mL    Bulb (mL): 60 mL    Bulb (mL): 86 mL    Bulb (mL): 63.5 mL    Voided (mL): 650 mL  Total OUT: 940.5 mL    Total NET: 2220.5 mL          LABS:                        8.5    9.49  )-----------( 137      ( 03 Apr 2025 07:50 )             25.9     04-03    141  |  107  |  11  ----------------------------<  105[H]  4.6   |  27  |  0.73    Ca    8.2[L]      03 Apr 2025 07:50        Urinalysis Basic - ( 03 Apr 2025 07:50 )    Color: x / Appearance: x / SG: x / pH: x  Gluc: 105 mg/dL / Ketone: x  / Bili: x / Urobili: x   Blood: x / Protein: x / Nitrite: x   Leuk Esterase: x / RBC: x / WBC x   Sq Epi: x / Non Sq Epi: x / Bacteria: x        Impression: 59y Female POD2 s/p bilateral mastectomy, b/l GHULAM reconstruction. Pt c/o nausea and pain. Altered pain regimen, trialing tramadol. For nausea, zofran/reglan not working. Trialing scop patch.   Exam stable, HD stable, hgb stable. Pending TOV, BS showed 237mL. Gave 500cc bolus, encouraging PO intake.      Plan:   SURGERY PROGRESS NOTE  Pt. seen and examined at bedside resting comfortably. ESTER. Pt reports tramadol helped pain, but gave her a headache. Scop patch improved nausea. Tolerating reg diet, denies vomiting. Mild nausea still present. Voiding freely.   Denies fever/chills, chest pain, dyspnea, cough, dizziness.     Vital Signs Last 24 Hrs  T(C): 37.1 (04 Apr 2025 09:02), Max: 37.1 (03 Apr 2025 20:30)  T(F): 98.7 (04 Apr 2025 09:02), Max: 98.7 (03 Apr 2025 20:30)  HR: 66 (04 Apr 2025 09:02) (66 - 79)  BP: 104/52 (04 Apr 2025 09:02) (94/58 - 116/77)  BP(mean): --  RR: 16 (04 Apr 2025 09:02) (15 - 16)  SpO2: 97% (04 Apr 2025 09:02) (95% - 99%)    Parameters below as of 04 Apr 2025 09:02  Patient On (Oxygen Delivery Method): room air      PHYSICAL EXAM:    GENERAL: NAD  HEAD:  Atraumatic, Normocephalic  EYES: EOMI, PERRLA, conjunctiva and sclera clear  Breast: b/l breast warm, soft, no collections noted, minimally tender to palpation, flaps viable, cook soppler signals strong b/l, incisions C/D/I, drains ss   ABDOMEN: soft, ND, appropriately tender to palpation, umbilicus viable, incisions C/D/I, drains serosang   EXTREMITIES:  calf soft, non tender b/l.     I&O's Detail    03 Apr 2025 07:01  -  04 Apr 2025 07:00  --------------------------------------------------------  IN:    IV PiggyBack: 100 mL    IV PiggyBack: 500 mL    Lactated Ringers: 1350 mL    Oral Fluid: 1211 mL  Total IN: 3161 mL    OUT:    Bulb (mL): 81 mL    Bulb (mL): 60 mL    Bulb (mL): 86 mL    Bulb (mL): 63.5 mL    Voided (mL): 650 mL  Total OUT: 940.5 mL    Total NET: 2220.5 mL          LABS:                        8.5    9.49  )-----------( 137      ( 03 Apr 2025 07:50 )             25.9     04-03    141  |  107  |  11  ----------------------------<  105[H]  4.6   |  27  |  0.73    Ca    8.2[L]      03 Apr 2025 07:50        Urinalysis Basic - ( 03 Apr 2025 07:50 )    Color: x / Appearance: x / SG: x / pH: x  Gluc: 105 mg/dL / Ketone: x  / Bili: x / Urobili: x   Blood: x / Protein: x / Nitrite: x   Leuk Esterase: x / RBC: x / WBC x   Sq Epi: x / Non Sq Epi: x / Bacteria: x        Impression: 59y Female POD2 s/p bilateral mastectomy, b/l HGULAM reconstruction. Pt c/o nausea and pain. Pt receiving standing Tylenol/toradol, prn narcotics. Scop patch working well for nausea. Pt voiding freely, w/o issue.  Exam stable, HD stable, hgb stable.    Plan:  - C/w diet  - C/w zofran & scop patch  - Tylenol/Toradol standing, tramadol prn  - OOB/ambulation today  - Drain care/education  - Plan for DC later today  - Pt has RX, if pt requests different narcotic will send  - Post op instructions discussed w/ pt  - Follow up Monday w/ Dr. Luna    Seen & discussed w/ Dr. Shikowitz Behr (covering Dr. Luna)  Surgery

## 2025-04-04 NOTE — DISCHARGE NOTE PROVIDER - CARE PROVIDER_API CALL
Stanley Luna  Plastic Surgery  [address] [place] [address], [address]  [place] [address]  Phone: [phone]  Fax: [phone]  Scheduled Appointment: 04/07/2025

## 2025-04-04 NOTE — DISCHARGE NOTE NURSING/CASE MANAGEMENT/SOCIAL WORK - FINANCIAL ASSISTANCE
[facility] provides services at a reduced cost to those who are determined to be eligible through [facility]’s financial assistance program. Information regarding [facility]’s financial assistance program can be found by going to [email or web address] or by calling [phone].

## 2025-04-04 NOTE — DISCHARGE NOTE PROVIDER - NSDCCPTREATMENT_GEN_ALL_CORE_FT
PRINCIPAL PROCEDURE  Procedure: Bilateral total mastectomy  Findings and Treatment:       SECONDARY PROCEDURE  Procedure: Breast reconstruction with GHULAM free flap  Findings and Treatment:

## 2025-04-04 NOTE — DISCHARGE NOTE PROVIDER - NSDCMRMEDTOKEN_GEN_ALL_CORE_FT
traMADol 50 mg oral tablet: 1 tab(s) orally every 6 hours as needed for  severe pain MDD: 4 tablets

## 2025-04-07 ENCOUNTER — APPOINTMENT (OUTPATIENT)
Dept: PLASTIC SURGERY | Facility: CLINIC | Age: 60
End: 2025-04-07
Payer: COMMERCIAL

## 2025-04-07 VITALS
WEIGHT: 150 LBS | TEMPERATURE: 98.7 F | HEIGHT: 68 IN | RESPIRATION RATE: 16 BRPM | DIASTOLIC BLOOD PRESSURE: 71 MMHG | SYSTOLIC BLOOD PRESSURE: 107 MMHG | OXYGEN SATURATION: 99 % | HEART RATE: 74 BPM | BODY MASS INDEX: 22.73 KG/M2

## 2025-04-07 PROCEDURE — 99024 POSTOP FOLLOW-UP VISIT: CPT

## 2025-04-09 LAB — SURGICAL PATHOLOGY STUDY: SIGNIFICANT CHANGE UP

## 2025-04-11 ENCOUNTER — APPOINTMENT (OUTPATIENT)
Dept: PLASTIC SURGERY | Facility: CLINIC | Age: 60
End: 2025-04-11
Payer: COMMERCIAL

## 2025-04-11 VITALS
HEIGHT: 60 IN | TEMPERATURE: 97 F | HEART RATE: 71 BPM | DIASTOLIC BLOOD PRESSURE: 70 MMHG | BODY MASS INDEX: 29.45 KG/M2 | OXYGEN SATURATION: 100 % | WEIGHT: 150 LBS | SYSTOLIC BLOOD PRESSURE: 119 MMHG

## 2025-04-11 PROBLEM — Z90.13 ACQUIRED ABSENCE OF BOTH BREASTS: Status: ACTIVE | Noted: 2025-04-07

## 2025-04-11 PROCEDURE — 99024 POSTOP FOLLOW-UP VISIT: CPT

## 2025-04-14 ENCOUNTER — NON-APPOINTMENT (OUTPATIENT)
Age: 60
End: 2025-04-14

## 2025-04-16 ENCOUNTER — APPOINTMENT (OUTPATIENT)
Dept: BREAST CENTER | Facility: CLINIC | Age: 60
End: 2025-04-16
Payer: COMMERCIAL

## 2025-04-16 ENCOUNTER — APPOINTMENT (OUTPATIENT)
Dept: PLASTIC SURGERY | Facility: CLINIC | Age: 60
End: 2025-04-16
Payer: COMMERCIAL

## 2025-04-16 VITALS
HEIGHT: 68 IN | HEART RATE: 88 BPM | BODY MASS INDEX: 22.73 KG/M2 | OXYGEN SATURATION: 98 % | DIASTOLIC BLOOD PRESSURE: 66 MMHG | TEMPERATURE: 97.6 F | WEIGHT: 150 LBS | SYSTOLIC BLOOD PRESSURE: 98 MMHG

## 2025-04-16 VITALS — SYSTOLIC BLOOD PRESSURE: 109 MMHG | HEART RATE: 79 BPM | OXYGEN SATURATION: 99 % | DIASTOLIC BLOOD PRESSURE: 73 MMHG

## 2025-04-16 DIAGNOSIS — Z85.3 PERSONAL HISTORY OF MALIGNANT NEOPLASM OF BREAST: ICD-10-CM

## 2025-04-16 DIAGNOSIS — M79.2 NEURALGIA AND NEURITIS, UNSPECIFIED: ICD-10-CM

## 2025-04-16 DIAGNOSIS — Z48.89 ENCOUNTER FOR OTHER SPECIFIED SURGICAL AFTERCARE: ICD-10-CM

## 2025-04-16 PROCEDURE — 99024 POSTOP FOLLOW-UP VISIT: CPT

## 2025-04-16 RX ORDER — GABAPENTIN 100 MG/1
100 CAPSULE ORAL
Qty: 30 | Refills: 2 | Status: ACTIVE | COMMUNITY
Start: 2025-04-16 | End: 1900-01-01

## 2025-04-17 PROBLEM — Z48.89 ENCOUNTER FOR POST SURGICAL WOUND CHECK: Status: ACTIVE | Noted: 2025-04-17

## 2025-04-21 ENCOUNTER — APPOINTMENT (OUTPATIENT)
Dept: PLASTIC SURGERY | Facility: CLINIC | Age: 60
End: 2025-04-21
Payer: COMMERCIAL

## 2025-04-21 VITALS — HEART RATE: 63 BPM | DIASTOLIC BLOOD PRESSURE: 73 MMHG | SYSTOLIC BLOOD PRESSURE: 114 MMHG | OXYGEN SATURATION: 100 %

## 2025-04-21 DIAGNOSIS — Z90.13 ACQUIRED ABSENCE OF BILATERAL BREASTS AND NIPPLES: ICD-10-CM

## 2025-04-21 PROCEDURE — 99024 POSTOP FOLLOW-UP VISIT: CPT

## 2025-05-07 ENCOUNTER — APPOINTMENT (OUTPATIENT)
Dept: PLASTIC SURGERY | Facility: CLINIC | Age: 60
End: 2025-05-07
Payer: COMMERCIAL

## 2025-05-07 VITALS — OXYGEN SATURATION: 100 % | HEART RATE: 87 BPM | SYSTOLIC BLOOD PRESSURE: 107 MMHG | DIASTOLIC BLOOD PRESSURE: 74 MMHG

## 2025-05-07 DIAGNOSIS — Z90.13 ACQUIRED ABSENCE OF BILATERAL BREASTS AND NIPPLES: ICD-10-CM

## 2025-05-07 DIAGNOSIS — Z48.89 ENCOUNTER FOR OTHER SPECIFIED SURGICAL AFTERCARE: ICD-10-CM

## 2025-05-07 PROCEDURE — 99024 POSTOP FOLLOW-UP VISIT: CPT

## 2025-06-24 ENCOUNTER — APPOINTMENT (OUTPATIENT)
Dept: PLASTIC SURGERY | Facility: CLINIC | Age: 60
End: 2025-06-24
Payer: COMMERCIAL

## 2025-06-24 VITALS
HEART RATE: 73 BPM | DIASTOLIC BLOOD PRESSURE: 76 MMHG | WEIGHT: 150 LBS | BODY MASS INDEX: 22.73 KG/M2 | OXYGEN SATURATION: 100 % | HEIGHT: 68 IN | SYSTOLIC BLOOD PRESSURE: 118 MMHG

## 2025-06-24 PROCEDURE — 99024 POSTOP FOLLOW-UP VISIT: CPT

## 2025-06-27 ENCOUNTER — APPOINTMENT (OUTPATIENT)
Dept: PLASTIC SURGERY | Facility: CLINIC | Age: 60
End: 2025-06-27

## 2025-09-08 RX ORDER — OXYCODONE AND ACETAMINOPHEN 5; 325 MG/1; MG/1
5-325 TABLET ORAL
Qty: 16 | Refills: 0 | Status: ACTIVE | COMMUNITY
Start: 2025-09-08 | End: 1900-01-01

## 2025-09-08 RX ORDER — IBUPROFEN 800 MG/1
800 TABLET, FILM COATED ORAL EVERY 6 HOURS
Qty: 20 | Refills: 0 | Status: ACTIVE | COMMUNITY
Start: 2025-09-08 | End: 1900-01-01

## 2025-09-11 ENCOUNTER — APPOINTMENT (OUTPATIENT)
Dept: PLASTIC SURGERY | Facility: AMBULATORY SURGERY CENTER | Age: 60
End: 2025-09-11
Payer: COMMERCIAL

## 2025-09-11 PROCEDURE — 15734 MUSCLE-SKIN GRAFT TRUNK: CPT

## 2025-09-11 PROCEDURE — 22901 EXC ABDL TUM DEEP 5 CM/>: CPT

## 2025-09-11 PROCEDURE — 19380 REVJ RECONSTRUCTED BREAST: CPT | Mod: LT

## 2025-09-15 ENCOUNTER — APPOINTMENT (OUTPATIENT)
Dept: PLASTIC SURGERY | Facility: CLINIC | Age: 60
End: 2025-09-15
Payer: COMMERCIAL

## 2025-09-15 VITALS
DIASTOLIC BLOOD PRESSURE: 76 MMHG | BODY MASS INDEX: 22.73 KG/M2 | OXYGEN SATURATION: 100 % | WEIGHT: 150 LBS | HEIGHT: 68 IN | SYSTOLIC BLOOD PRESSURE: 117 MMHG | HEART RATE: 75 BPM | RESPIRATION RATE: 16 BRPM | TEMPERATURE: 97.6 F

## 2025-09-15 PROCEDURE — 99024 POSTOP FOLLOW-UP VISIT: CPT

## (undated) DEVICE — SYR LUER LOK 3CC

## (undated) DEVICE — TUBING TRUWAVE PRESSURE MALE/FEMALE 12"

## (undated) DEVICE — DRAPE IOBAN 33" X 23"

## (undated) DEVICE — NDL COUNTER FOAM AND MAGNET 40-70

## (undated) DEVICE — GOWN LG

## (undated) DEVICE — SOL IRR POUR NS 0.9% 500ML

## (undated) DEVICE — DRAPE WARMING SOLUTION 44 X 44"

## (undated) DEVICE — DRAPE INSTRUMENT POUCH 6.75" X 11"

## (undated) DEVICE — WARMING BLANKET LOWER ADULT

## (undated) DEVICE — SPEAR SURG EYE WECK-CELL CELOS

## (undated) DEVICE — SUT MONOCRYL 2-0 27" SH UNDYED

## (undated) DEVICE — PREP CHLORAPREP HI-LITE ORANGE 26ML

## (undated) DEVICE — NDL HYPO REGULAR BEVEL 25G X 1.5" (BLUE)

## (undated) DEVICE — POSITIONER STRAP ARMBOARD VELCRO TS-30

## (undated) DEVICE — WOUND IRR SURGIPHOR

## (undated) DEVICE — LONE STAR ELASTIC STAY HOOK 12MM BLUNT

## (undated) DEVICE — FOLEY TRAY 16FR 5CC LF UMETER CLOSED

## (undated) DEVICE — SUT VICRYL 0 36" CT-1 UNDYED

## (undated) DEVICE — DRAPE SPLIT SHEET 77" X 120"

## (undated) DEVICE — ELCTR BOVIE PENCIL BLADE 10FT

## (undated) DEVICE — DRAPE FLUID WARMER (BLUE) 44 X 44"

## (undated) DEVICE — GLV 8 PROTEXIS (BLUE)

## (undated) DEVICE — Device

## (undated) DEVICE — PACK DEEP INFERIOR EPI PERFORATOR

## (undated) DEVICE — ELCTR PLASMA BLADE X 3.0S LIGHT

## (undated) DEVICE — MARKING PEN W RULER

## (undated) DEVICE — SUT SILK 2-0 30" SH

## (undated) DEVICE — SYR LUER LOK 10CC

## (undated) DEVICE — SUT STRATAFIX SPIRAL PDS PLUS 3-0 14CM PS-2 UNDYED

## (undated) DEVICE — SOL IRR POUR H2O 500ML

## (undated) DEVICE — VENODYNE/SCD SLEEVE CALF MEDIUM

## (undated) DEVICE — SOL IRR POUR H2O 1500ML

## (undated) DEVICE — SUT MONOCRYL 3-0 27" SH UNDYED

## (undated) DEVICE — DRSG DERMABOND PRINEO 60CM

## (undated) DEVICE — SUT PROLENE 5-0 36" RB-1

## (undated) DEVICE — ELCTR GROUNDING PAD ADULT COVIDIEN

## (undated) DEVICE — CLAMP MICROVASCULAR SINGLE  1-2MM

## (undated) DEVICE — PACK MINOR

## (undated) DEVICE — DRAPE 3/4 SHEET 52X76"

## (undated) DEVICE — DRAIN RESERVOIR FOR JACKSON PRATT 100CC CARDINAL

## (undated) DEVICE — ELCTR BOVIE TIP BLADE INSULATED 2.75" EDGE

## (undated) DEVICE — MERCIAN VISABILITY BACKROUND YELLOW

## (undated) DEVICE — ULTRASOUND GEL 0.25L

## (undated) DEVICE — SUT MONOCRYL 4-0 18" P-3 UNDYED

## (undated) DEVICE — WOUND IRR IRRISEPT W 0.5 CHG

## (undated) DEVICE — LONE STAR RETRACTOR RING 12MM BLUNT DISP

## (undated) DEVICE — DRAPE ARMATEC MICROSSCOPE HANDLE 5" X 10"

## (undated) DEVICE — GLV 7 PROTEXIS (WHITE)

## (undated) DEVICE — SUT QUILL MONODERM 2-0 14CM 26MM UNDYED

## (undated) DEVICE — BIPOLAR FORCEP KIRWAN JEWELERS STR 4" X 0.4MM W 12FT CORD (GREEN)

## (undated) DEVICE — SUT MONOCRYL 4-0 27" PS-2 UNDYED

## (undated) DEVICE — STAPLER SKIN VISI-STAT 35 WIDE

## (undated) DEVICE — SUT PDS II 3-0 27" SH UNDYED

## (undated) DEVICE — DOPPLER PROBE  CABLE

## (undated) DEVICE — GLV 7.5 PROTEXIS (WHITE)

## (undated) DEVICE — PACK FREE FLAP

## (undated) DEVICE — CLAMP MICROVASCULAR DOUBLE  1-2MM

## (undated) DEVICE — BULB SYRINGE 2OZ (BLUE)

## (undated) DEVICE — TUBING IV EXTENSION MACRO W CLAVE 7"

## (undated) DEVICE — SUT ETHILON 8-0 5" BV130-5

## (undated) DEVICE — SOL IRR POUR NS 0.9% 1500ML

## (undated) DEVICE — SUT QUILL PDO 2 CTX UNI 70CM VIO

## (undated) DEVICE — SUT STRATFX SYMM 0 PDS PLUS 15CM CT-1

## (undated) DEVICE — SUT ETHILON 3-0 18" FS-1